# Patient Record
Sex: MALE | Race: WHITE | Employment: FULL TIME | URBAN - METROPOLITAN AREA
[De-identification: names, ages, dates, MRNs, and addresses within clinical notes are randomized per-mention and may not be internally consistent; named-entity substitution may affect disease eponyms.]

---

## 2017-01-09 ENCOUNTER — TELEPHONE (OUTPATIENT)
Dept: INTERNAL MEDICINE CLINIC | Age: 57
End: 2017-01-09

## 2017-01-10 ENCOUNTER — OFFICE VISIT (OUTPATIENT)
Dept: INTERNAL MEDICINE CLINIC | Age: 57
End: 2017-01-10

## 2017-01-10 VITALS
WEIGHT: 232 LBS | DIASTOLIC BLOOD PRESSURE: 76 MMHG | SYSTOLIC BLOOD PRESSURE: 130 MMHG | TEMPERATURE: 98.4 F | OXYGEN SATURATION: 94 % | HEART RATE: 64 BPM | BODY MASS INDEX: 30.75 KG/M2 | HEIGHT: 73 IN

## 2017-01-10 DIAGNOSIS — J32.9 SINUSITIS, UNSPECIFIED CHRONICITY, UNSPECIFIED LOCATION: Primary | ICD-10-CM

## 2017-01-10 PROCEDURE — 99213 OFFICE O/P EST LOW 20 MIN: CPT | Performed by: INTERNAL MEDICINE

## 2017-01-10 RX ORDER — AZITHROMYCIN 250 MG/1
TABLET, FILM COATED ORAL
Qty: 1 PACKET | Refills: 0 | Status: SHIPPED | OUTPATIENT
Start: 2017-01-10 | End: 2017-01-20

## 2017-01-10 ASSESSMENT — ENCOUNTER SYMPTOMS
RHINORRHEA: 0
SHORTNESS OF BREATH: 0
SINUS PRESSURE: 1
CHEST TIGHTNESS: 0
WHEEZING: 0
SORE THROAT: 0
COUGH: 0

## 2017-05-26 RX ORDER — LISINOPRIL 40 MG/1
40 TABLET ORAL DAILY
Qty: 90 TABLET | Refills: 0 | Status: SHIPPED | OUTPATIENT
Start: 2017-05-26 | End: 2018-01-09

## 2017-05-26 RX ORDER — AMLODIPINE BESYLATE 10 MG/1
10 TABLET ORAL DAILY
Qty: 90 TABLET | Refills: 0 | Status: SHIPPED | OUTPATIENT
Start: 2017-05-26 | End: 2018-01-09 | Stop reason: SDUPTHER

## 2017-05-26 RX ORDER — ATORVASTATIN CALCIUM 80 MG/1
TABLET, FILM COATED ORAL
Qty: 30 TABLET | Refills: 0 | Status: SHIPPED | OUTPATIENT
Start: 2017-05-26 | End: 2017-06-22 | Stop reason: SDUPTHER

## 2017-06-22 RX ORDER — AZITHROMYCIN 250 MG/1
TABLET, FILM COATED ORAL
Qty: 1 PACKET | Refills: 0 | Status: SHIPPED | OUTPATIENT
Start: 2017-06-22 | End: 2017-06-30 | Stop reason: ALTCHOICE

## 2017-06-22 RX ORDER — ATORVASTATIN CALCIUM 80 MG/1
TABLET, FILM COATED ORAL
Qty: 30 TABLET | Refills: 0 | Status: SHIPPED | OUTPATIENT
Start: 2017-06-22 | End: 2017-06-30 | Stop reason: SDUPTHER

## 2017-06-30 ENCOUNTER — OFFICE VISIT (OUTPATIENT)
Dept: INTERNAL MEDICINE CLINIC | Age: 57
End: 2017-06-30

## 2017-06-30 VITALS
TEMPERATURE: 98.5 F | HEART RATE: 62 BPM | WEIGHT: 237 LBS | RESPIRATION RATE: 12 BRPM | OXYGEN SATURATION: 97 % | HEIGHT: 73 IN | BODY MASS INDEX: 31.41 KG/M2 | SYSTOLIC BLOOD PRESSURE: 122 MMHG | DIASTOLIC BLOOD PRESSURE: 80 MMHG

## 2017-06-30 DIAGNOSIS — Z00.00 ANNUAL PHYSICAL EXAM: Primary | ICD-10-CM

## 2017-06-30 DIAGNOSIS — E11.9 TYPE 2 DIABETES MELLITUS WITHOUT COMPLICATION, WITHOUT LONG-TERM CURRENT USE OF INSULIN (HCC): ICD-10-CM

## 2017-06-30 DIAGNOSIS — I10 ESSENTIAL HYPERTENSION: ICD-10-CM

## 2017-06-30 DIAGNOSIS — E78.2 MIXED HYPERLIPIDEMIA: ICD-10-CM

## 2017-06-30 DIAGNOSIS — E66.9 OBESITY (BMI 30.0-34.9): ICD-10-CM

## 2017-06-30 PROBLEM — E66.811 OBESITY (BMI 30.0-34.9): Status: ACTIVE | Noted: 2017-06-30

## 2017-06-30 LAB
A/G RATIO: 2.1 (ref 1.1–2.2)
ALBUMIN SERPL-MCNC: 5 G/DL (ref 3.4–5)
ALP BLD-CCNC: 106 U/L (ref 40–129)
ALT SERPL-CCNC: 30 U/L (ref 10–40)
ANION GAP SERPL CALCULATED.3IONS-SCNC: 19 MMOL/L (ref 3–16)
AST SERPL-CCNC: 26 U/L (ref 15–37)
BILIRUB SERPL-MCNC: 1.1 MG/DL (ref 0–1)
BILIRUBIN, POC: NORMAL
BLOOD URINE, POC: NORMAL
BUN BLDV-MCNC: 18 MG/DL (ref 7–20)
CALCIUM SERPL-MCNC: 10.1 MG/DL (ref 8.3–10.6)
CHLORIDE BLD-SCNC: 99 MMOL/L (ref 99–110)
CHOLESTEROL, TOTAL: 207 MG/DL (ref 0–199)
CLARITY, POC: CLEAR
CO2: 21 MMOL/L (ref 21–32)
COLOR, POC: YELLOW
CREAT SERPL-MCNC: 0.9 MG/DL (ref 0.9–1.3)
CREATININE URINE: 116.3 MG/DL (ref 39–259)
GFR AFRICAN AMERICAN: >60
GFR NON-AFRICAN AMERICAN: >60
GLOBULIN: 2.4 G/DL
GLUCOSE BLD-MCNC: 117 MG/DL (ref 70–99)
GLUCOSE URINE, POC: NORMAL
HDLC SERPL-MCNC: 41 MG/DL (ref 40–60)
KETONES, POC: NORMAL
LDL CHOLESTEROL CALCULATED: 118 MG/DL
LEUKOCYTE EST, POC: NORMAL
MICROALBUMIN UR-MCNC: <1.2 MG/DL
MICROALBUMIN/CREAT UR-RTO: NORMAL MG/G (ref 0–30)
NITRITE, POC: NORMAL
PH, POC: 5
POTASSIUM SERPL-SCNC: 4.3 MMOL/L (ref 3.5–5.1)
PROTEIN, POC: NORMAL
SODIUM BLD-SCNC: 139 MMOL/L (ref 136–145)
SPECIFIC GRAVITY, POC: 1.01
TOTAL PROTEIN: 7.4 G/DL (ref 6.4–8.2)
TRIGL SERPL-MCNC: 238 MG/DL (ref 0–150)
UROBILINOGEN, POC: 0.2
VLDLC SERPL CALC-MCNC: 48 MG/DL

## 2017-06-30 PROCEDURE — 99396 PREV VISIT EST AGE 40-64: CPT | Performed by: INTERNAL MEDICINE

## 2017-06-30 PROCEDURE — 81002 URINALYSIS NONAUTO W/O SCOPE: CPT | Performed by: INTERNAL MEDICINE

## 2017-06-30 RX ORDER — ATORVASTATIN CALCIUM 80 MG/1
TABLET, FILM COATED ORAL
Qty: 90 TABLET | Refills: 1 | Status: SHIPPED | OUTPATIENT
Start: 2017-06-30 | End: 2018-01-09 | Stop reason: SDUPTHER

## 2017-06-30 ASSESSMENT — ENCOUNTER SYMPTOMS
SINUS PRESSURE: 0
RHINORRHEA: 0
VOICE CHANGE: 0
CHEST TIGHTNESS: 0
TROUBLE SWALLOWING: 0
ABDOMINAL DISTENTION: 0
EYE PAIN: 0
SHORTNESS OF BREATH: 0
BACK PAIN: 0
NAUSEA: 0
COLOR CHANGE: 0
COUGH: 0
WHEEZING: 0
APNEA: 0
VOMITING: 0
ABDOMINAL PAIN: 0
CONSTIPATION: 0
DIARRHEA: 0

## 2017-07-01 LAB
ESTIMATED AVERAGE GLUCOSE: 148.5 MG/DL
HBA1C MFR BLD: 6.8 %

## 2017-10-06 RX ORDER — SILDENAFIL CITRATE 20 MG/1
TABLET ORAL
Qty: 30 TABLET | Refills: 1 | Status: SHIPPED | OUTPATIENT
Start: 2017-10-06 | End: 2017-11-09 | Stop reason: SDUPTHER

## 2017-11-09 RX ORDER — SILDENAFIL CITRATE 20 MG/1
TABLET ORAL
Qty: 30 TABLET | Refills: 0 | Status: SHIPPED | OUTPATIENT
Start: 2017-11-09 | End: 2017-12-01 | Stop reason: SDUPTHER

## 2017-12-01 RX ORDER — SILDENAFIL CITRATE 20 MG/1
TABLET ORAL
Qty: 30 TABLET | Refills: 0 | Status: SHIPPED | OUTPATIENT
Start: 2017-12-01 | End: 2017-12-21 | Stop reason: SDUPTHER

## 2017-12-21 RX ORDER — SILDENAFIL CITRATE 20 MG/1
TABLET ORAL
Qty: 30 TABLET | Refills: 0 | Status: SHIPPED | OUTPATIENT
Start: 2017-12-21 | End: 2018-01-04 | Stop reason: SDUPTHER

## 2018-01-02 RX ORDER — LISINOPRIL 40 MG/1
TABLET ORAL
Qty: 30 TABLET | Refills: 0 | Status: SHIPPED | OUTPATIENT
Start: 2018-01-02 | End: 2018-01-09

## 2018-01-02 RX ORDER — AMLODIPINE BESYLATE 10 MG/1
TABLET ORAL
Qty: 30 TABLET | Refills: 0 | Status: SHIPPED | OUTPATIENT
Start: 2018-01-02 | End: 2018-01-09

## 2018-01-04 RX ORDER — SILDENAFIL CITRATE 20 MG/1
TABLET ORAL
Qty: 30 TABLET | Refills: 1 | Status: SHIPPED | OUTPATIENT
Start: 2018-01-04 | End: 2018-02-16 | Stop reason: SDUPTHER

## 2018-01-04 NOTE — TELEPHONE ENCOUNTER
Tell him that I sent the medication BUT he can get 30 pills at [facility] for less than $15 if he prints the coupon for them at [facility].

## 2018-01-09 ENCOUNTER — OFFICE VISIT (OUTPATIENT)
Dept: INTERNAL MEDICINE CLINIC | Age: 58
End: 2018-01-09

## 2018-01-09 VITALS
WEIGHT: 244 LBS | SYSTOLIC BLOOD PRESSURE: 160 MMHG | DIASTOLIC BLOOD PRESSURE: 92 MMHG | BODY MASS INDEX: 32.19 KG/M2 | HEART RATE: 60 BPM

## 2018-01-09 DIAGNOSIS — Z12.5 SPECIAL SCREENING FOR MALIGNANT NEOPLASM OF PROSTATE: ICD-10-CM

## 2018-01-09 DIAGNOSIS — N52.9 ERECTILE DYSFUNCTION, UNSPECIFIED ERECTILE DYSFUNCTION TYPE: ICD-10-CM

## 2018-01-09 DIAGNOSIS — Z23 FLU VACCINE NEED: ICD-10-CM

## 2018-01-09 DIAGNOSIS — E78.2 MIXED HYPERLIPIDEMIA: ICD-10-CM

## 2018-01-09 DIAGNOSIS — E11.9 TYPE 2 DIABETES MELLITUS WITHOUT COMPLICATION, WITHOUT LONG-TERM CURRENT USE OF INSULIN (HCC): Primary | ICD-10-CM

## 2018-01-09 DIAGNOSIS — I10 ESSENTIAL HYPERTENSION: ICD-10-CM

## 2018-01-09 PROCEDURE — 90630 INFLUENZA, QUADV, 18-64 YRS, ID, PF, MICRO INJ, 0.1ML (FLUZONE QUADV, PF): CPT | Performed by: INTERNAL MEDICINE

## 2018-01-09 PROCEDURE — 90471 IMMUNIZATION ADMIN: CPT | Performed by: INTERNAL MEDICINE

## 2018-01-09 PROCEDURE — 99214 OFFICE O/P EST MOD 30 MIN: CPT | Performed by: INTERNAL MEDICINE

## 2018-01-09 RX ORDER — ATORVASTATIN CALCIUM 80 MG/1
TABLET, FILM COATED ORAL
Qty: 90 TABLET | Refills: 1 | Status: SHIPPED | OUTPATIENT
Start: 2018-01-09 | End: 2018-08-01 | Stop reason: SDUPTHER

## 2018-01-09 RX ORDER — AMLODIPINE BESYLATE 10 MG/1
10 TABLET ORAL DAILY
Qty: 90 TABLET | Refills: 1 | Status: SHIPPED | OUTPATIENT
Start: 2018-01-09 | End: 2018-08-01 | Stop reason: SDUPTHER

## 2018-01-09 RX ORDER — LISINOPRIL AND HYDROCHLOROTHIAZIDE 25; 20 MG/1; MG/1
1 TABLET ORAL DAILY
Qty: 90 TABLET | Refills: 1 | Status: SHIPPED | OUTPATIENT
Start: 2018-01-09 | End: 2018-07-10 | Stop reason: SDUPTHER

## 2018-01-09 ASSESSMENT — PATIENT HEALTH QUESTIONNAIRE - PHQ9
SUM OF ALL RESPONSES TO PHQ9 QUESTIONS 1 & 2: 0
1. LITTLE INTEREST OR PLEASURE IN DOING THINGS: 0
2. FEELING DOWN, DEPRESSED OR HOPELESS: 0
SUM OF ALL RESPONSES TO PHQ QUESTIONS 1-9: 0

## 2018-01-09 ASSESSMENT — ENCOUNTER SYMPTOMS
DIARRHEA: 0
COUGH: 0
SHORTNESS OF BREATH: 0
ABDOMINAL PAIN: 0
CONSTIPATION: 0

## 2018-01-09 NOTE — PROGRESS NOTES
INJ, 0.1ML (FLUZONE QUADV, PF)   3. Erectile dysfunction, unspecified erectile dysfunction type N52.9 607.84    4. Special screening for malignant neoplasm of prostate Z12.5 V76.44 Psa screening   5. Essential hypertension I10 401.9 Comprehensive Metabolic Panel   6. Mixed hyperlipidemia E78.2 272.2 Lipid Panel          Plan:      Dm 2 - check lab  Monitor!     ED - rx    htn - change meds, add hctz and reduce lisinopril  Watch bp   Goals discussed    Lipid - check lab

## 2018-01-10 LAB
A/G RATIO: 2 (ref 1.1–2.2)
ALBUMIN SERPL-MCNC: 4.9 G/DL (ref 3.4–5)
ALP BLD-CCNC: 106 U/L (ref 40–129)
ALT SERPL-CCNC: 32 U/L (ref 10–40)
ANION GAP SERPL CALCULATED.3IONS-SCNC: 17 MMOL/L (ref 3–16)
AST SERPL-CCNC: 27 U/L (ref 15–37)
BILIRUB SERPL-MCNC: 0.9 MG/DL (ref 0–1)
BUN BLDV-MCNC: 8 MG/DL (ref 7–20)
CALCIUM SERPL-MCNC: 10.1 MG/DL (ref 8.3–10.6)
CHLORIDE BLD-SCNC: 99 MMOL/L (ref 99–110)
CHOLESTEROL, TOTAL: 211 MG/DL (ref 0–199)
CO2: 26 MMOL/L (ref 21–32)
CREAT SERPL-MCNC: 1 MG/DL (ref 0.9–1.3)
ESTIMATED AVERAGE GLUCOSE: 154.2 MG/DL
GFR AFRICAN AMERICAN: >60
GFR NON-AFRICAN AMERICAN: >60
GLOBULIN: 2.5 G/DL
GLUCOSE BLD-MCNC: 101 MG/DL (ref 70–99)
HBA1C MFR BLD: 7 %
HDLC SERPL-MCNC: 42 MG/DL (ref 40–60)
LDL CHOLESTEROL CALCULATED: 115 MG/DL
POTASSIUM SERPL-SCNC: 4.5 MMOL/L (ref 3.5–5.1)
PROSTATE SPECIFIC ANTIGEN: 1.46 NG/ML (ref 0–4)
SODIUM BLD-SCNC: 142 MMOL/L (ref 136–145)
TOTAL PROTEIN: 7.4 G/DL (ref 6.4–8.2)
TRIGL SERPL-MCNC: 270 MG/DL (ref 0–150)
VLDLC SERPL CALC-MCNC: 54 MG/DL

## 2018-02-13 ENCOUNTER — TELEPHONE (OUTPATIENT)
Dept: INTERNAL MEDICINE CLINIC | Age: 58
End: 2018-02-13

## 2018-02-13 RX ORDER — AMOXICILLIN 875 MG/1
875 TABLET, COATED ORAL 2 TIMES DAILY
Qty: 20 TABLET | Refills: 0 | Status: SHIPPED | OUTPATIENT
Start: 2018-02-13 | End: 2018-02-14 | Stop reason: CLARIF

## 2018-02-14 ENCOUNTER — OFFICE VISIT (OUTPATIENT)
Dept: INTERNAL MEDICINE CLINIC | Age: 58
End: 2018-02-14

## 2018-02-14 VITALS
HEART RATE: 68 BPM | HEIGHT: 73 IN | BODY MASS INDEX: 32.37 KG/M2 | OXYGEN SATURATION: 96 % | TEMPERATURE: 98.5 F | DIASTOLIC BLOOD PRESSURE: 80 MMHG | WEIGHT: 244.2 LBS | SYSTOLIC BLOOD PRESSURE: 122 MMHG

## 2018-02-14 DIAGNOSIS — R05.9 COUGH: Primary | ICD-10-CM

## 2018-02-14 PROCEDURE — 99213 OFFICE O/P EST LOW 20 MIN: CPT | Performed by: INTERNAL MEDICINE

## 2018-02-14 RX ORDER — AMOXICILLIN 875 MG/1
875 TABLET, COATED ORAL 2 TIMES DAILY
COMMUNITY
End: 2018-08-01 | Stop reason: ALTCHOICE

## 2018-02-14 RX ORDER — AZELASTINE 1 MG/ML
2 SPRAY, METERED NASAL 2 TIMES DAILY PRN
Qty: 1 BOTTLE | Refills: 0 | Status: SHIPPED | OUTPATIENT
Start: 2018-02-14 | End: 2019-02-14

## 2018-02-14 ASSESSMENT — ENCOUNTER SYMPTOMS
COUGH: 1
NAUSEA: 0
SHORTNESS OF BREATH: 0
VOMITING: 0

## 2018-02-16 RX ORDER — SILDENAFIL CITRATE 20 MG/1
TABLET ORAL
Qty: 30 TABLET | Refills: 0 | Status: SHIPPED | OUTPATIENT
Start: 2018-02-16 | End: 2018-03-03 | Stop reason: SDUPTHER

## 2018-03-05 RX ORDER — SILDENAFIL CITRATE 20 MG/1
TABLET ORAL
Qty: 30 TABLET | Refills: 2 | Status: SHIPPED | OUTPATIENT
Start: 2018-03-05 | End: 2018-04-29 | Stop reason: SDUPTHER

## 2018-04-04 ENCOUNTER — OFFICE VISIT (OUTPATIENT)
Dept: INTERNAL MEDICINE CLINIC | Age: 58
End: 2018-04-04

## 2018-04-04 VITALS
BODY MASS INDEX: 31.24 KG/M2 | OXYGEN SATURATION: 98 % | SYSTOLIC BLOOD PRESSURE: 138 MMHG | HEART RATE: 58 BPM | WEIGHT: 236.8 LBS | DIASTOLIC BLOOD PRESSURE: 80 MMHG

## 2018-04-04 DIAGNOSIS — R55 SYNCOPE, UNSPECIFIED SYNCOPE TYPE: Primary | ICD-10-CM

## 2018-04-04 DIAGNOSIS — E11.9 TYPE 2 DIABETES MELLITUS WITHOUT COMPLICATION, WITHOUT LONG-TERM CURRENT USE OF INSULIN (HCC): ICD-10-CM

## 2018-04-04 PROCEDURE — 99214 OFFICE O/P EST MOD 30 MIN: CPT | Performed by: INTERNAL MEDICINE

## 2018-04-04 RX ORDER — LANCETS 30 GAUGE
EACH MISCELLANEOUS
Qty: 100 EACH | Refills: 1 | Status: SHIPPED | OUTPATIENT
Start: 2018-04-04

## 2018-04-04 RX ORDER — GLUCOSAMINE HCL/CHONDROITIN SU 500-400 MG
CAPSULE ORAL
Qty: 100 STRIP | Refills: 1 | Status: SHIPPED | OUTPATIENT
Start: 2018-04-04 | End: 2018-11-25 | Stop reason: SDUPTHER

## 2018-04-04 ASSESSMENT — ENCOUNTER SYMPTOMS
VOMITING: 0
WHEEZING: 0
EYE PAIN: 0
NAUSEA: 0
SHORTNESS OF BREATH: 0
ABDOMINAL PAIN: 0

## 2018-04-18 ENCOUNTER — TELEPHONE (OUTPATIENT)
Dept: INTERNAL MEDICINE CLINIC | Age: 58
End: 2018-04-18

## 2018-04-30 RX ORDER — SILDENAFIL CITRATE 20 MG/1
TABLET ORAL
Qty: 30 TABLET | Refills: 1 | Status: SHIPPED | OUTPATIENT
Start: 2018-04-30 | End: 2019-07-31 | Stop reason: SDUPTHER

## 2018-05-11 ENCOUNTER — TELEPHONE (OUTPATIENT)
Dept: INTERNAL MEDICINE CLINIC | Age: 58
End: 2018-05-11

## 2018-07-10 RX ORDER — LISINOPRIL AND HYDROCHLOROTHIAZIDE 25; 20 MG/1; MG/1
TABLET ORAL
Qty: 90 TABLET | Refills: 0 | Status: SHIPPED | OUTPATIENT
Start: 2018-07-10 | End: 2018-10-11 | Stop reason: SDUPTHER

## 2018-08-01 ENCOUNTER — OFFICE VISIT (OUTPATIENT)
Dept: INTERNAL MEDICINE CLINIC | Age: 58
End: 2018-08-01

## 2018-08-01 VITALS
BODY MASS INDEX: 31.17 KG/M2 | TEMPERATURE: 98 F | WEIGHT: 235.2 LBS | SYSTOLIC BLOOD PRESSURE: 130 MMHG | HEART RATE: 60 BPM | OXYGEN SATURATION: 94 % | HEIGHT: 73 IN | DIASTOLIC BLOOD PRESSURE: 86 MMHG

## 2018-08-01 DIAGNOSIS — E78.2 MIXED HYPERLIPIDEMIA: ICD-10-CM

## 2018-08-01 DIAGNOSIS — Z00.00 ANNUAL PHYSICAL EXAM: Primary | ICD-10-CM

## 2018-08-01 DIAGNOSIS — Z00.00 ANNUAL PHYSICAL EXAM: ICD-10-CM

## 2018-08-01 DIAGNOSIS — I10 ESSENTIAL HYPERTENSION: ICD-10-CM

## 2018-08-01 DIAGNOSIS — E11.9 TYPE 2 DIABETES MELLITUS WITHOUT COMPLICATION, WITHOUT LONG-TERM CURRENT USE OF INSULIN (HCC): ICD-10-CM

## 2018-08-01 LAB
A/G RATIO: 2 (ref 1.1–2.2)
ALBUMIN SERPL-MCNC: 5.1 G/DL (ref 3.4–5)
ALP BLD-CCNC: 97 U/L (ref 40–129)
ALT SERPL-CCNC: 31 U/L (ref 10–40)
ANION GAP SERPL CALCULATED.3IONS-SCNC: 14 MMOL/L (ref 3–16)
AST SERPL-CCNC: 25 U/L (ref 15–37)
BILIRUB SERPL-MCNC: 0.5 MG/DL (ref 0–1)
BILIRUBIN, POC: NEGATIVE
BLOOD URINE, POC: NEGATIVE
BUN BLDV-MCNC: 17 MG/DL (ref 7–20)
CALCIUM SERPL-MCNC: 10.3 MG/DL (ref 8.3–10.6)
CHLORIDE BLD-SCNC: 98 MMOL/L (ref 99–110)
CHOLESTEROL, TOTAL: 191 MG/DL (ref 0–199)
CLARITY, POC: CLEAR
CO2: 26 MMOL/L (ref 21–32)
COLOR, POC: YELLOW
CREAT SERPL-MCNC: 1.1 MG/DL (ref 0.9–1.3)
CREATININE URINE: 81.6 MG/DL (ref 39–259)
GFR AFRICAN AMERICAN: >60
GFR NON-AFRICAN AMERICAN: >60
GLOBULIN: 2.5 G/DL
GLUCOSE BLD-MCNC: 155 MG/DL (ref 70–99)
GLUCOSE URINE, POC: NEGATIVE
HDLC SERPL-MCNC: 43 MG/DL (ref 40–60)
KETONES, POC: NEGATIVE
LDL CHOLESTEROL CALCULATED: 100 MG/DL
LEUKOCYTE EST, POC: NEGATIVE
MICROALBUMIN UR-MCNC: <1.2 MG/DL
MICROALBUMIN/CREAT UR-RTO: NORMAL MG/G (ref 0–30)
NITRITE, POC: NEGATIVE
PH, POC: 6
POTASSIUM SERPL-SCNC: 4.4 MMOL/L (ref 3.5–5.1)
PROTEIN, POC: NEGATIVE
SODIUM BLD-SCNC: 138 MMOL/L (ref 136–145)
SPECIFIC GRAVITY, POC: 1.01
TOTAL PROTEIN: 7.6 G/DL (ref 6.4–8.2)
TRIGL SERPL-MCNC: 241 MG/DL (ref 0–150)
UROBILINOGEN, POC: 0.2
VLDLC SERPL CALC-MCNC: 48 MG/DL

## 2018-08-01 PROCEDURE — 99396 PREV VISIT EST AGE 40-64: CPT | Performed by: INTERNAL MEDICINE

## 2018-08-01 PROCEDURE — 81002 URINALYSIS NONAUTO W/O SCOPE: CPT | Performed by: INTERNAL MEDICINE

## 2018-08-01 RX ORDER — AMLODIPINE BESYLATE 10 MG/1
10 TABLET ORAL DAILY
Qty: 90 TABLET | Refills: 1 | Status: SHIPPED | OUTPATIENT
Start: 2018-08-01 | End: 2019-02-11 | Stop reason: SDUPTHER

## 2018-08-01 RX ORDER — ATORVASTATIN CALCIUM 80 MG/1
TABLET, FILM COATED ORAL
Qty: 90 TABLET | Refills: 1 | Status: SHIPPED | OUTPATIENT
Start: 2018-08-01 | End: 2019-03-12 | Stop reason: SDUPTHER

## 2018-08-01 ASSESSMENT — ENCOUNTER SYMPTOMS
WHEEZING: 0
CHEST TIGHTNESS: 0
VOICE CHANGE: 0
CONSTIPATION: 0
ABDOMINAL DISTENTION: 0
SINUS PRESSURE: 0
COUGH: 0
BACK PAIN: 1
TROUBLE SWALLOWING: 0
DIARRHEA: 0
NAUSEA: 0
EYE PAIN: 0
SHORTNESS OF BREATH: 0
COLOR CHANGE: 0
RHINORRHEA: 0
ABDOMINAL PAIN: 0
APNEA: 0
VOMITING: 0

## 2018-08-01 NOTE — PATIENT INSTRUCTIONS
Please talk to your pharmacist about how much the new shingles vaccination (Shingrix) would cost you. In the state of [place], you do not need a prescription from a doctor to receive this. Please bring both your glucose monitor and your glucose records to your next appointment.

## 2018-08-01 NOTE — PROGRESS NOTES
Medical History:       Diagnosis Date    Elevated IOP     Environmental allergies     Erectile dysfunction 9/14/2010    Hyperlipidemia     Hypertension     Sciatica     sees chiro    Type 2 diabetes mellitus without complication (HCC)      Social History  Social History   Substance Use Topics    Smoking status: Never Smoker    Smokeless tobacco: Never Used    Alcohol use 2.2 oz/week     2 Cans of beer, 2 Standard drinks or equivalent per week      Comment:  2 beers per week     Prior Surgical History:      Procedure Laterality Date    KNEE SURGERY      right    TONSILLECTOMY      VASECTOMY       Family History:   Family History   Problem Relation Age of Onset    Cancer Mother         Kidney cancer    High Blood Pressure Mother     Heart Disease Mother         cad, cabg    Coronary Art Dis Mother     Other Father         Colonic polyps-- not cancer    High Blood Pressure Father     High Cholesterol Father     Diabetes Father     Cancer Father         Lung cancer, laryngeal    Cancer Maternal Grandmother         ? ?    Heart Attack Maternal Grandfather     Dislocations Paternal Grandmother     Other Paternal Grandmother         CVA    Diabetes Paternal Grandmother     Other Paternal Grandfather         Surgical complications for prostate    High Blood Pressure Sister     Cancer Sister         uterine    High Blood Pressure Brother     Diabetes Brother          Review of Systems   Constitutional: Negative for activity change, appetite change, chills, fatigue, fever and unexpected weight change. HENT: Negative for dental problem, hearing loss (possibly minor), rhinorrhea, sinus pressure, tinnitus, trouble swallowing and voice change. Eyes: Negative for pain and visual disturbance. Last eye exam within the last year   Respiratory: Negative for apnea, cough, chest tightness, shortness of breath and wheezing.     Cardiovascular: Negative for chest pain, palpitations and leg swelling. Gastrointestinal: Negative for abdominal distention, abdominal pain, constipation, diarrhea, nausea and vomiting. No heartburn   Endocrine: Negative for cold intolerance and heat intolerance. Genitourinary: Negative for difficulty urinating, dysuria, frequency, hematuria, scrotal swelling and testicular pain. No nocturia  ED - occ uses sildenafil   Musculoskeletal: Positive for arthralgias (see hpi) and back pain (only occasionally, good since doing core exercises). Negative for joint swelling and myalgias. Skin: Negative for color change, pallor, rash and wound. Allergic/Immunologic: Positive for environmental allergies (good year). Neurological: Negative for dizziness, tremors, syncope, speech difficulty, weakness, numbness and headaches. Hematological: Negative for adenopathy. Does not bruise/bleed easily. Psychiatric/Behavioral: Negative for dysphoric mood, sleep disturbance and suicidal ideas. The patient is not nervous/anxious. Objective:   Physical Exam   Constitutional: He is oriented to person, place, and time. He appears well-developed and well-nourished. HENT:   Head: Normocephalic and atraumatic. Right Ear: External ear normal.   Left Ear: External ear normal.   Nose: Nose normal.   Mouth/Throat: Oropharynx is clear and moist.   Eyes: Conjunctivae and EOM are normal. Pupils are equal, round, and reactive to light. No scleral icterus. Neck: Normal range of motion. Neck supple. No JVD present. No thyromegaly present. Cardiovascular: Normal rate, regular rhythm, normal heart sounds and intact distal pulses. Pulmonary/Chest: Effort normal and breath sounds normal.   Abdominal: Soft. Bowel sounds are normal. He exhibits no distension and no mass. There is no tenderness. There is no rebound and no guarding. Genitourinary: Rectum normal and prostate normal. Rectal exam shows guaiac negative stool. Musculoskeletal: Normal range of motion.  He exhibits no

## 2018-08-02 LAB
ESTIMATED AVERAGE GLUCOSE: 151.3 MG/DL
HBA1C MFR BLD: 6.9 %

## 2018-10-11 RX ORDER — LISINOPRIL AND HYDROCHLOROTHIAZIDE 25; 20 MG/1; MG/1
1 TABLET ORAL DAILY
Qty: 30 TABLET | Refills: 0 | Status: SHIPPED | OUTPATIENT
Start: 2018-10-11 | End: 2018-11-09 | Stop reason: SDUPTHER

## 2018-11-09 RX ORDER — LISINOPRIL AND HYDROCHLOROTHIAZIDE 25; 20 MG/1; MG/1
TABLET ORAL
Qty: 30 TABLET | Refills: 0 | Status: SHIPPED | OUTPATIENT
Start: 2018-11-09 | End: 2018-12-08 | Stop reason: SDUPTHER

## 2018-11-25 DIAGNOSIS — E11.9 TYPE 2 DIABETES MELLITUS WITHOUT COMPLICATION, WITHOUT LONG-TERM CURRENT USE OF INSULIN (HCC): ICD-10-CM

## 2018-12-10 RX ORDER — LISINOPRIL AND HYDROCHLOROTHIAZIDE 25; 20 MG/1; MG/1
TABLET ORAL
Qty: 30 TABLET | Refills: 0 | Status: SHIPPED | OUTPATIENT
Start: 2018-12-10 | End: 2019-01-10 | Stop reason: SDUPTHER

## 2019-01-10 RX ORDER — LISINOPRIL AND HYDROCHLOROTHIAZIDE 25; 20 MG/1; MG/1
TABLET ORAL
Qty: 30 TABLET | Refills: 0 | Status: SHIPPED | OUTPATIENT
Start: 2019-01-10 | End: 2019-02-11 | Stop reason: SDUPTHER

## 2019-02-06 ENCOUNTER — OFFICE VISIT (OUTPATIENT)
Dept: INTERNAL MEDICINE CLINIC | Age: 59
End: 2019-02-06
Payer: COMMERCIAL

## 2019-02-06 VITALS
DIASTOLIC BLOOD PRESSURE: 72 MMHG | OXYGEN SATURATION: 96 % | BODY MASS INDEX: 31.73 KG/M2 | HEIGHT: 73 IN | HEART RATE: 73 BPM | SYSTOLIC BLOOD PRESSURE: 118 MMHG | WEIGHT: 239.4 LBS

## 2019-02-06 DIAGNOSIS — I10 ESSENTIAL HYPERTENSION: ICD-10-CM

## 2019-02-06 DIAGNOSIS — E78.2 MIXED HYPERLIPIDEMIA: ICD-10-CM

## 2019-02-06 DIAGNOSIS — E11.9 TYPE 2 DIABETES MELLITUS WITHOUT COMPLICATION, WITHOUT LONG-TERM CURRENT USE OF INSULIN (HCC): Primary | ICD-10-CM

## 2019-02-06 LAB — HBA1C MFR BLD: 8.6 %

## 2019-02-06 PROCEDURE — G8427 DOCREV CUR MEDS BY ELIG CLIN: HCPCS | Performed by: INTERNAL MEDICINE

## 2019-02-06 PROCEDURE — 99214 OFFICE O/P EST MOD 30 MIN: CPT | Performed by: INTERNAL MEDICINE

## 2019-02-06 PROCEDURE — G8484 FLU IMMUNIZE NO ADMIN: HCPCS | Performed by: INTERNAL MEDICINE

## 2019-02-06 PROCEDURE — G8417 CALC BMI ABV UP PARAM F/U: HCPCS | Performed by: INTERNAL MEDICINE

## 2019-02-06 PROCEDURE — 1036F TOBACCO NON-USER: CPT | Performed by: INTERNAL MEDICINE

## 2019-02-06 PROCEDURE — 3017F COLORECTAL CA SCREEN DOC REV: CPT | Performed by: INTERNAL MEDICINE

## 2019-02-06 PROCEDURE — 2022F DILAT RTA XM EVC RTNOPTHY: CPT | Performed by: INTERNAL MEDICINE

## 2019-02-06 PROCEDURE — 83036 HEMOGLOBIN GLYCOSYLATED A1C: CPT | Performed by: INTERNAL MEDICINE

## 2019-02-06 PROCEDURE — 3045F PR MOST RECENT HEMOGLOBIN A1C LEVEL 7.0-9.0%: CPT | Performed by: INTERNAL MEDICINE

## 2019-02-06 RX ORDER — LISINOPRIL AND HYDROCHLOROTHIAZIDE 25; 20 MG/1; MG/1
TABLET ORAL
Qty: 30 TABLET | Refills: 0 | OUTPATIENT
Start: 2019-02-06

## 2019-02-06 RX ORDER — AMLODIPINE BESYLATE 10 MG/1
10 TABLET ORAL DAILY
Qty: 90 TABLET | Refills: 1 | OUTPATIENT
Start: 2019-02-06

## 2019-02-06 ASSESSMENT — PATIENT HEALTH QUESTIONNAIRE - PHQ9
2. FEELING DOWN, DEPRESSED OR HOPELESS: 0
SUM OF ALL RESPONSES TO PHQ9 QUESTIONS 1 & 2: 0
SUM OF ALL RESPONSES TO PHQ QUESTIONS 1-9: 0
1. LITTLE INTEREST OR PLEASURE IN DOING THINGS: 0
SUM OF ALL RESPONSES TO PHQ QUESTIONS 1-9: 0

## 2019-02-12 RX ORDER — LISINOPRIL AND HYDROCHLOROTHIAZIDE 25; 20 MG/1; MG/1
TABLET ORAL
Qty: 90 TABLET | Refills: 1 | Status: SHIPPED | OUTPATIENT
Start: 2019-02-12 | End: 2019-07-31 | Stop reason: SDUPTHER

## 2019-02-12 RX ORDER — AMLODIPINE BESYLATE 10 MG/1
10 TABLET ORAL DAILY
Qty: 90 TABLET | Refills: 1 | Status: SHIPPED | OUTPATIENT
Start: 2019-02-12 | End: 2019-07-31 | Stop reason: SDUPTHER

## 2019-02-26 DIAGNOSIS — E11.9 TYPE 2 DIABETES MELLITUS WITHOUT COMPLICATION, WITHOUT LONG-TERM CURRENT USE OF INSULIN (HCC): ICD-10-CM

## 2019-03-12 RX ORDER — ATORVASTATIN CALCIUM 80 MG/1
TABLET, FILM COATED ORAL
Qty: 90 TABLET | Refills: 1 | Status: SHIPPED | OUTPATIENT
Start: 2019-03-12 | End: 2019-07-31 | Stop reason: SDUPTHER

## 2019-07-30 ENCOUNTER — TELEPHONE (OUTPATIENT)
Dept: INTERNAL MEDICINE CLINIC | Age: 59
End: 2019-07-30

## 2019-07-31 ENCOUNTER — OFFICE VISIT (OUTPATIENT)
Dept: INTERNAL MEDICINE CLINIC | Age: 59
End: 2019-07-31
Payer: COMMERCIAL

## 2019-07-31 VITALS
HEART RATE: 70 BPM | WEIGHT: 230 LBS | HEIGHT: 73 IN | DIASTOLIC BLOOD PRESSURE: 80 MMHG | RESPIRATION RATE: 16 BRPM | OXYGEN SATURATION: 98 % | SYSTOLIC BLOOD PRESSURE: 128 MMHG | BODY MASS INDEX: 30.48 KG/M2

## 2019-07-31 DIAGNOSIS — E11.9 TYPE 2 DIABETES MELLITUS WITHOUT COMPLICATION, WITHOUT LONG-TERM CURRENT USE OF INSULIN (HCC): Primary | ICD-10-CM

## 2019-07-31 DIAGNOSIS — E78.2 MIXED HYPERLIPIDEMIA: ICD-10-CM

## 2019-07-31 DIAGNOSIS — I10 ESSENTIAL HYPERTENSION: ICD-10-CM

## 2019-07-31 LAB — HBA1C MFR BLD: 7.4 %

## 2019-07-31 PROCEDURE — G8417 CALC BMI ABV UP PARAM F/U: HCPCS | Performed by: INTERNAL MEDICINE

## 2019-07-31 PROCEDURE — 83036 HEMOGLOBIN GLYCOSYLATED A1C: CPT | Performed by: INTERNAL MEDICINE

## 2019-07-31 PROCEDURE — 2022F DILAT RTA XM EVC RTNOPTHY: CPT | Performed by: INTERNAL MEDICINE

## 2019-07-31 PROCEDURE — 1036F TOBACCO NON-USER: CPT | Performed by: INTERNAL MEDICINE

## 2019-07-31 PROCEDURE — 3045F PR MOST RECENT HEMOGLOBIN A1C LEVEL 7.0-9.0%: CPT | Performed by: INTERNAL MEDICINE

## 2019-07-31 PROCEDURE — 99214 OFFICE O/P EST MOD 30 MIN: CPT | Performed by: INTERNAL MEDICINE

## 2019-07-31 PROCEDURE — 3017F COLORECTAL CA SCREEN DOC REV: CPT | Performed by: INTERNAL MEDICINE

## 2019-07-31 PROCEDURE — G8427 DOCREV CUR MEDS BY ELIG CLIN: HCPCS | Performed by: INTERNAL MEDICINE

## 2019-07-31 RX ORDER — LISINOPRIL AND HYDROCHLOROTHIAZIDE 25; 20 MG/1; MG/1
TABLET ORAL
Qty: 90 TABLET | Refills: 3 | Status: SHIPPED | OUTPATIENT
Start: 2019-07-31 | End: 2020-07-29

## 2019-07-31 RX ORDER — SILDENAFIL CITRATE 20 MG/1
TABLET ORAL
Qty: 30 TABLET | Refills: 3 | Status: SHIPPED | OUTPATIENT
Start: 2019-07-31 | End: 2019-10-15 | Stop reason: SDUPTHER

## 2019-07-31 RX ORDER — AMLODIPINE BESYLATE 10 MG/1
10 TABLET ORAL DAILY
Qty: 90 TABLET | Refills: 3 | Status: SHIPPED | OUTPATIENT
Start: 2019-07-31 | End: 2020-07-29

## 2019-07-31 RX ORDER — ATORVASTATIN CALCIUM 80 MG/1
TABLET, FILM COATED ORAL
Qty: 90 TABLET | Refills: 3 | Status: SHIPPED | OUTPATIENT
Start: 2019-07-31 | End: 2020-09-02

## 2019-07-31 NOTE — ASSESSMENT & PLAN NOTE
Diabetes Mellitus Type II:  Home blood sugar records reviewed: fasting range: 120-130. No significant episodes of hypoglycemia. Compliant with medications. No polyuria, polydipsia, visual changes, foot problems, GI upset. Diabetic diet compliance:  compliant all of the time. Current exercise: none. Will check labs, and refill medications as appropriate. Hypertension:  Denies CP, SOB, visual changes, dizziness, palpitations or HA. He is adherent to a low sodium diet. Blood pressure typically runs ok outside of the office. Continue current medications. Hyperlipidemia:  No new myalgias or GI upset on current medications. Lab Results   Component Value Date    LABA1C 8.6 02/06/2019    LABA1C 6.9 08/01/2018    LABA1C 7.0 01/09/2018     Lab Results   Component Value Date    LABMICR <1.20 08/01/2018    CREATININE 1.1 08/01/2018     Lab Results   Component Value Date    ALT 31 08/01/2018    AST 25 08/01/2018     No components found for: CHLPL  Lab Results   Component Value Date    TRIG 241 (H) 08/01/2018     Lab Results   Component Value Date    HDL 43 08/01/2018     Lab Results   Component Value Date    LDLCALC 100 08/01/2018    LDLDIRECT 120 03/22/2016         BP Readings from Last 2 Encounters:   07/31/19 128/80   02/06/19 118/72     Hemoglobin A1C (%)   Date Value   02/06/2019 8.6     Microscopic Examination (no units)   Date Value   09/19/2016 Not Indicated     Microalbumin, Random Urine (mg/dL)   Date Value   08/01/2018 <1.20     LDL Calculated (mg/dL)   Date Value   08/01/2018 100 (H)              Tobacco use:  Patient  reports that he has never smoked. He has never used smokeless tobacco.    If Smoker - Cessation materials given? NA    Is Daily aspirin on medication list?:  Yes    Diabetic retinal exam done? Yes   If yes, document in Health Maintenance. Monofilament placed on counter? Yes    Shoes and socks removed? Yes    BP taken with correct size cuff?  Yes   Repeated if > 130/80 Yes

## 2019-10-15 RX ORDER — SILDENAFIL CITRATE 20 MG/1
TABLET ORAL
Qty: 30 TABLET | Refills: 2 | Status: SHIPPED | OUTPATIENT
Start: 2019-10-15 | End: 2019-11-29 | Stop reason: SDUPTHER

## 2019-11-01 ENCOUNTER — OFFICE VISIT (OUTPATIENT)
Dept: PRIMARY CARE CLINIC | Age: 59
End: 2019-11-01
Payer: COMMERCIAL

## 2019-11-01 VITALS
OXYGEN SATURATION: 99 % | DIASTOLIC BLOOD PRESSURE: 80 MMHG | WEIGHT: 225.2 LBS | SYSTOLIC BLOOD PRESSURE: 130 MMHG | HEART RATE: 58 BPM | HEIGHT: 73 IN | BODY MASS INDEX: 29.85 KG/M2

## 2019-11-01 DIAGNOSIS — E11.9 TYPE 2 DIABETES MELLITUS WITHOUT COMPLICATION, WITHOUT LONG-TERM CURRENT USE OF INSULIN (HCC): ICD-10-CM

## 2019-11-01 DIAGNOSIS — N52.9 ERECTILE DYSFUNCTION, UNSPECIFIED ERECTILE DYSFUNCTION TYPE: ICD-10-CM

## 2019-11-01 DIAGNOSIS — E78.2 MIXED HYPERLIPIDEMIA: ICD-10-CM

## 2019-11-01 DIAGNOSIS — Z12.5 SCREENING FOR PROSTATE CANCER: ICD-10-CM

## 2019-11-01 DIAGNOSIS — Z00.00 PHYSICAL EXAM, ANNUAL: Primary | ICD-10-CM

## 2019-11-01 DIAGNOSIS — I10 ESSENTIAL HYPERTENSION: ICD-10-CM

## 2019-11-01 DIAGNOSIS — Z23 NEED FOR INFLUENZA VACCINATION: ICD-10-CM

## 2019-11-01 LAB
A/G RATIO: 3.2 (ref 1.1–2.2)
ALBUMIN SERPL-MCNC: 5.4 G/DL (ref 3.4–5)
ALP BLD-CCNC: 86 U/L (ref 40–129)
ALT SERPL-CCNC: 28 U/L (ref 10–40)
ANION GAP SERPL CALCULATED.3IONS-SCNC: 17 MMOL/L (ref 3–16)
AST SERPL-CCNC: 26 U/L (ref 15–37)
BASOPHILS ABSOLUTE: 0.1 K/UL (ref 0–0.2)
BASOPHILS RELATIVE PERCENT: 1.5 %
BILIRUB SERPL-MCNC: 0.6 MG/DL (ref 0–1)
BUN BLDV-MCNC: 19 MG/DL (ref 7–20)
CALCIUM SERPL-MCNC: 9.8 MG/DL (ref 8.3–10.6)
CHLORIDE BLD-SCNC: 101 MMOL/L (ref 99–110)
CHOLESTEROL, TOTAL: 177 MG/DL (ref 0–199)
CO2: 23 MMOL/L (ref 21–32)
CREAT SERPL-MCNC: 1 MG/DL (ref 0.9–1.3)
CREATININE URINE: 115.2 MG/DL (ref 39–259)
EOSINOPHILS ABSOLUTE: 0.1 K/UL (ref 0–0.6)
EOSINOPHILS RELATIVE PERCENT: 2.8 %
GFR AFRICAN AMERICAN: >60
GFR NON-AFRICAN AMERICAN: >60
GLOBULIN: 1.7 G/DL
GLUCOSE BLD-MCNC: 139 MG/DL (ref 70–99)
HBA1C MFR BLD: 6.7 %
HCT VFR BLD CALC: 45.5 % (ref 40.5–52.5)
HDLC SERPL-MCNC: 39 MG/DL (ref 40–60)
HEMOGLOBIN: 15.8 G/DL (ref 13.5–17.5)
LDL CHOLESTEROL CALCULATED: 107 MG/DL
LYMPHOCYTES ABSOLUTE: 1.2 K/UL (ref 1–5.1)
LYMPHOCYTES RELATIVE PERCENT: 24 %
MCH RBC QN AUTO: 31.6 PG (ref 26–34)
MCHC RBC AUTO-ENTMCNC: 34.6 G/DL (ref 31–36)
MCV RBC AUTO: 91.3 FL (ref 80–100)
MICROALBUMIN UR-MCNC: <1.2 MG/DL
MICROALBUMIN/CREAT UR-RTO: NORMAL MG/G (ref 0–30)
MONOCYTES ABSOLUTE: 0.4 K/UL (ref 0–1.3)
MONOCYTES RELATIVE PERCENT: 8.8 %
NEUTROPHILS ABSOLUTE: 3.2 K/UL (ref 1.7–7.7)
NEUTROPHILS RELATIVE PERCENT: 62.9 %
PDW BLD-RTO: 12.9 % (ref 12.4–15.4)
PLATELET # BLD: 217 K/UL (ref 135–450)
PMV BLD AUTO: 8.8 FL (ref 5–10.5)
POTASSIUM SERPL-SCNC: 4.2 MMOL/L (ref 3.5–5.1)
PROSTATE SPECIFIC ANTIGEN: 1.41 NG/ML (ref 0–4)
RBC # BLD: 4.98 M/UL (ref 4.2–5.9)
SODIUM BLD-SCNC: 141 MMOL/L (ref 136–145)
TOTAL PROTEIN: 7.1 G/DL (ref 6.4–8.2)
TRIGL SERPL-MCNC: 153 MG/DL (ref 0–150)
TSH SERPL DL<=0.05 MIU/L-ACNC: 2.67 UIU/ML (ref 0.27–4.2)
VLDLC SERPL CALC-MCNC: 31 MG/DL
WBC # BLD: 5.1 K/UL (ref 4–11)

## 2019-11-01 PROCEDURE — 90686 IIV4 VACC NO PRSV 0.5 ML IM: CPT | Performed by: INTERNAL MEDICINE

## 2019-11-01 PROCEDURE — 99396 PREV VISIT EST AGE 40-64: CPT | Performed by: INTERNAL MEDICINE

## 2019-11-01 PROCEDURE — G8482 FLU IMMUNIZE ORDER/ADMIN: HCPCS | Performed by: INTERNAL MEDICINE

## 2019-11-01 PROCEDURE — 83036 HEMOGLOBIN GLYCOSYLATED A1C: CPT | Performed by: INTERNAL MEDICINE

## 2019-11-01 PROCEDURE — 90471 IMMUNIZATION ADMIN: CPT | Performed by: INTERNAL MEDICINE

## 2019-11-29 RX ORDER — SILDENAFIL CITRATE 20 MG/1
TABLET ORAL
Qty: 30 TABLET | Refills: 1 | Status: SHIPPED | OUTPATIENT
Start: 2019-11-29 | End: 2019-12-30

## 2019-12-30 RX ORDER — SILDENAFIL CITRATE 20 MG/1
TABLET ORAL
Qty: 30 TABLET | Refills: 0 | Status: SHIPPED | OUTPATIENT
Start: 2019-12-30 | End: 2021-04-12 | Stop reason: SDUPTHER

## 2020-01-18 RX ORDER — SILDENAFIL CITRATE 20 MG/1
TABLET ORAL
Qty: 30 TABLET | Refills: 0 | OUTPATIENT
Start: 2020-01-18

## 2020-01-18 NOTE — TELEPHONE ENCOUNTER
Medication:   Requested Prescriptions     Pending Prescriptions Disp Refills    sildenafil (REVATIO) 20 MG tablet [Pharmacy Med Name: SILDENAFIL 20 MG TABLET] 30 tablet 0     Sig: TAKE TWO TABLETS BY MOUTH DAILY AS NEEDED     Last Filled:  12/30/19    Last appt: 11/1/2019   Next appt: Visit date not found

## 2020-03-04 ENCOUNTER — TELEPHONE (OUTPATIENT)
Dept: PRIMARY CARE CLINIC | Age: 60
End: 2020-03-04

## 2020-03-04 ENCOUNTER — OFFICE VISIT (OUTPATIENT)
Dept: PRIMARY CARE CLINIC | Age: 60
End: 2020-03-04
Payer: COMMERCIAL

## 2020-03-04 VITALS
DIASTOLIC BLOOD PRESSURE: 84 MMHG | SYSTOLIC BLOOD PRESSURE: 136 MMHG | BODY MASS INDEX: 29.42 KG/M2 | OXYGEN SATURATION: 100 % | TEMPERATURE: 98.1 F | WEIGHT: 223 LBS | HEART RATE: 64 BPM

## 2020-03-04 PROCEDURE — 99213 OFFICE O/P EST LOW 20 MIN: CPT | Performed by: INTERNAL MEDICINE

## 2020-03-04 PROCEDURE — 3017F COLORECTAL CA SCREEN DOC REV: CPT | Performed by: INTERNAL MEDICINE

## 2020-03-04 PROCEDURE — G8417 CALC BMI ABV UP PARAM F/U: HCPCS | Performed by: INTERNAL MEDICINE

## 2020-03-04 PROCEDURE — 1036F TOBACCO NON-USER: CPT | Performed by: INTERNAL MEDICINE

## 2020-03-04 PROCEDURE — G8482 FLU IMMUNIZE ORDER/ADMIN: HCPCS | Performed by: INTERNAL MEDICINE

## 2020-03-04 PROCEDURE — G8427 DOCREV CUR MEDS BY ELIG CLIN: HCPCS | Performed by: INTERNAL MEDICINE

## 2020-03-04 RX ORDER — MONTELUKAST SODIUM 10 MG/1
10 TABLET ORAL DAILY
Qty: 30 TABLET | Refills: 3 | Status: SHIPPED | OUTPATIENT
Start: 2020-03-04 | End: 2020-07-02

## 2020-03-04 RX ORDER — AZITHROMYCIN 250 MG/1
250 TABLET, FILM COATED ORAL SEE ADMIN INSTRUCTIONS
Qty: 6 TABLET | Refills: 0 | Status: SHIPPED | OUTPATIENT
Start: 2020-03-04 | End: 2020-03-09

## 2020-03-04 SDOH — ECONOMIC STABILITY: TRANSPORTATION INSECURITY
IN THE PAST 12 MONTHS, HAS LACK OF TRANSPORTATION KEPT YOU FROM MEETINGS, WORK, OR FROM GETTING THINGS NEEDED FOR DAILY LIVING?: NO

## 2020-03-04 SDOH — ECONOMIC STABILITY: INCOME INSECURITY: HOW HARD IS IT FOR YOU TO PAY FOR THE VERY BASICS LIKE FOOD, HOUSING, MEDICAL CARE, AND HEATING?: NOT HARD AT ALL

## 2020-03-04 SDOH — ECONOMIC STABILITY: FOOD INSECURITY: WITHIN THE PAST 12 MONTHS, YOU WORRIED THAT YOUR FOOD WOULD RUN OUT BEFORE YOU GOT MONEY TO BUY MORE.: NEVER TRUE

## 2020-03-04 SDOH — ECONOMIC STABILITY: FOOD INSECURITY: WITHIN THE PAST 12 MONTHS, THE FOOD YOU BOUGHT JUST DIDN'T LAST AND YOU DIDN'T HAVE MONEY TO GET MORE.: NEVER TRUE

## 2020-03-04 SDOH — ECONOMIC STABILITY: TRANSPORTATION INSECURITY
IN THE PAST 12 MONTHS, HAS THE LACK OF TRANSPORTATION KEPT YOU FROM MEDICAL APPOINTMENTS OR FROM GETTING MEDICATIONS?: NO

## 2020-03-04 ASSESSMENT — PATIENT HEALTH QUESTIONNAIRE - PHQ9
SUM OF ALL RESPONSES TO PHQ QUESTIONS 1-9: 0
SUM OF ALL RESPONSES TO PHQ9 QUESTIONS 1 & 2: 0
SUM OF ALL RESPONSES TO PHQ QUESTIONS 1-9: 0
1. LITTLE INTEREST OR PLEASURE IN DOING THINGS: 0
2. FEELING DOWN, DEPRESSED OR HOPELESS: 0

## 2020-03-16 RX ORDER — CLOTRIMAZOLE 10 MG/1
10 LOZENGE ORAL; TOPICAL 3 TIMES DAILY
Qty: 15 TABLET | Refills: 0 | Status: SHIPPED | OUTPATIENT
Start: 2020-03-16 | End: 2020-03-21

## 2020-07-02 ENCOUNTER — TELEPHONE (OUTPATIENT)
Dept: ADMINISTRATIVE | Age: 60
End: 2020-07-02

## 2020-07-02 ENCOUNTER — OFFICE VISIT (OUTPATIENT)
Dept: PRIMARY CARE CLINIC | Age: 60
End: 2020-07-02
Payer: COMMERCIAL

## 2020-07-02 VITALS
TEMPERATURE: 98.7 F | DIASTOLIC BLOOD PRESSURE: 80 MMHG | OXYGEN SATURATION: 99 % | HEART RATE: 59 BPM | HEIGHT: 73 IN | WEIGHT: 216 LBS | SYSTOLIC BLOOD PRESSURE: 120 MMHG | RESPIRATION RATE: 15 BRPM | BODY MASS INDEX: 28.63 KG/M2

## 2020-07-02 LAB — HBA1C MFR BLD: 6.5 %

## 2020-07-02 PROCEDURE — 3017F COLORECTAL CA SCREEN DOC REV: CPT | Performed by: INTERNAL MEDICINE

## 2020-07-02 PROCEDURE — 83036 HEMOGLOBIN GLYCOSYLATED A1C: CPT | Performed by: INTERNAL MEDICINE

## 2020-07-02 PROCEDURE — 2022F DILAT RTA XM EVC RTNOPTHY: CPT | Performed by: INTERNAL MEDICINE

## 2020-07-02 PROCEDURE — G8427 DOCREV CUR MEDS BY ELIG CLIN: HCPCS | Performed by: INTERNAL MEDICINE

## 2020-07-02 PROCEDURE — 99214 OFFICE O/P EST MOD 30 MIN: CPT | Performed by: INTERNAL MEDICINE

## 2020-07-02 PROCEDURE — 3044F HG A1C LEVEL LT 7.0%: CPT | Performed by: INTERNAL MEDICINE

## 2020-07-02 PROCEDURE — 1036F TOBACCO NON-USER: CPT | Performed by: INTERNAL MEDICINE

## 2020-07-02 PROCEDURE — G8417 CALC BMI ABV UP PARAM F/U: HCPCS | Performed by: INTERNAL MEDICINE

## 2020-07-02 NOTE — TELEPHONE ENCOUNTER
Patient's spouse called today with patient stating his right ear is clogged. Was offered an appointment for Virtual , refused appointment. Patient would like Billy Anne MD  to schedule an in office ear irrigation.      pharmacy confirmed:[facility]. [facility] [address] [facility]    Patient was made aware of e-visits via [employer]

## 2020-07-02 NOTE — PROGRESS NOTES
Subjective:      Patient ID: Nanci Jay is a 60 y.o. male. HPI  Established patient here for a visit to manage acute and chronic medical conditions as detailed below. C/o bilateral wax impaction, cannot hear   Essential hypertension  This is a chronic problem. The problem is well controlled. Patient monitors readings regularly. Pertinent negatives include no chest pain, focal sensory loss, focal weakness, leg pain, myalgias or shortness of breath. No headaches or chest pain. Takes medications regularly. Blood pressure has been stable, blood work was reviewed, and advised patient to continue the current instructions or medications. Mixed hyperlipidemia  This has been a long standing problem, takes lipitor      Monitors diet and tries to follow a low fat diet. Has  been reasonably  compliant w exercise. Lipids have been stable, The problem is controlled. Recent lipid tests were reviewed and are normal. Pertinent negatives include no chest pain, focal sensory loss, focal weakness, leg pain, myalgias or shortness of breath. Advised patient to continue the current instructions or medications. Type 2 diabetes mellitus without complication, without long-term current use of insulin ([facility])  Diabetes Mellitus Type II:  Home blood sugar records reviewed: fasting range: 120-130. No significant episodes of hypoglycemia. Compliant with medications. No polyuria, polydipsia, visual changes, foot problems, GI upset. Diabetic diet compliance:  compliant most of the time. Current exercise: none. Will check labs, and refill medications as appropriate. Hypertension:  Denies CP, SOB, visual changes, dizziness, palpitations or HA. He is adherent to a low sodium diet. Blood pressure typically runs ok  outside of the office. Continue current medications. Hyperlipidemia:  No new myalgias or GI upset on current medications.        Lab Results   Component Value Date    LABA1C 6.7 11/01/2019    LABA1C 7.4 07/31/2019    LABA1C 8.6 02/06/2019     Lab Results   Component Value Date    LABMICR <1.20 11/01/2019    CREATININE 1.0 11/01/2019     Lab Results   Component Value Date    ALT 28 11/01/2019    AST 26 11/01/2019     No components found for: CHLPL  Lab Results   Component Value Date    TRIG 153 (H) 11/01/2019     Lab Results   Component Value Date    HDL 39 (L) 11/01/2019     Lab Results   Component Value Date    LDLCALC 107 11/01/2019    LDLDIRECT 120 03/22/2016         BP Readings from Last 2 Encounters:   07/02/20 120/80   03/04/20 136/84     Hemoglobin A1C (%)   Date Value   07/02/2020 6.5     Microscopic Examination (no units)   Date Value   09/19/2016 Not Indicated     Microalbumin, Random Urine (mg/dL)   Date Value   11/01/2019 <1.20     LDL Calculated (mg/dL)   Date Value   11/01/2019 107 (H)              Tobacco use:  Patient  reports that he has never smoked. He has never used smokeless tobacco.    If Smoker - Cessation materials given? NA    Is Daily aspirin on medication list?:  Yes    Diabetic retinal exam done? Yes   If yes, document in Health Maintenance. Monofilament placed on counter? Yes    Shoes and socks removed? Yes    BP taken with correct size cuff? Yes   Repeated if > 130/80 Yes     Microalbumin performed if applicable? Yes       Review of Systems  ROS: No unusual headaches or allergy symptoms or blurred vision. No prolonged cough. No flushing or facial pain or chest pain,dizziness, dyspnea, palpitations, or chest pain on exertion. No syncope. No nausea or vommitting or diarrhea. No jaundice or abdominal pain, change in bowel habits, black or bloody stools. No dysuria or hematuria or frequency of urination. No myalgias or muscle pain. No numbness, weakness, or tingling. No falls, or loss of consciousness. No weight loss or back pain. No falls. No paresthesias. No joint swelling or redness. No joint pain. No recent weight loss.  No focal weakness or sensory deficits or paresthesias, No confusion or altered sensorium. No hematemesis. No hearing loss. No siezures. All other systems were reviewed, and review was negative. Objective:   Physical Exam  /80 (Site: Left Upper Arm, Position: Sitting, Cuff Size: Medium Adult)   Pulse 59   Temp 98.7 °F (37.1 °C) (Oral)   Resp 15   Ht 6' 1\" (1.854 m)   Wt 216 lb (98 kg)   SpO2 99%   BMI 28.50 kg/m²    The physical exam reveals a patient who appears well, alert and oriented x 3, pleasant, cooperative. Vitals are as noted. Head is atraumatic and normocephalic. Eyes reveal normal conjunctiva, cornea normal, pupils are equal and rective to light. Nasal mucosa is normal. Throat is normal without exudates. Ears reveal normal tympanic membranes. Neck is supple and free of adenopathy, or masses. No thyromegaly. No jugular venous distension. Lungs are clear to auscultation, no rales or rhonchi noted. Heart sounds are regular , no murmurs, clicks, gallops or rubs. Abdomen is soft, no tenderness, masses or organomegaly. Bowel sounds are normally heard. Pelvis: normal. Extremities are normal. Peripheral pulses are normal. Screening neurological exam is normal without focal findings. Cranial nerves are intact, reflexes are symmetrical and muscle strength eaqual. Skin is normal without suspicious lesions noted. Assessment:      Essential hypertension  This is a chronic problem. The problem is well controlled. Patient monitors readings regularly. Pertinent negatives include no chest pain, focal sensory loss, focal weakness, leg pain, myalgias or shortness of breath. No headaches or chest pain. Takes medications regularly. Blood pressure has been stable, blood work was reviewed, and advised patient to continue the current instructions or medications. Mixed hyperlipidemia  This has been a long standing problem, takes lipitor      Monitors diet and tries to follow a low fat diet. Has  been reasonably  compliant w exercise.  Lipids have been stable, The problem is controlled. Recent lipid tests were reviewed and are normal. Pertinent negatives include no chest pain, focal sensory loss, focal weakness, leg pain, myalgias or shortness of breath. Advised patient to continue the current instructions or medications. Type 2 diabetes mellitus without complication, without long-term current use of insulin (HCC)  Diabetes Mellitus Type II:  Home blood sugar records reviewed: fasting range: 120-130. No significant episodes of hypoglycemia. Compliant with medications. No polyuria, polydipsia, visual changes, foot problems, GI upset. Diabetic diet compliance:  compliant most of the time. Current exercise: none. Will check labs, and refill medications as appropriate. Hypertension:  Denies CP, SOB, visual changes, dizziness, palpitations or HA. He is adherent to a low sodium diet. Blood pressure typically runs ok  outside of the office. Continue current medications. Hyperlipidemia:  No new myalgias or GI upset on current medications.        Lab Results   Component Value Date    LABA1C 6.7 11/01/2019    LABA1C 7.4 07/31/2019    LABA1C 8.6 02/06/2019     Lab Results   Component Value Date    LABMICR <1.20 11/01/2019    CREATININE 1.0 11/01/2019     Lab Results   Component Value Date    ALT 28 11/01/2019    AST 26 11/01/2019     No components found for: CHLPL  Lab Results   Component Value Date    TRIG 153 (H) 11/01/2019     Lab Results   Component Value Date    HDL 39 (L) 11/01/2019     Lab Results   Component Value Date    LDLCALC 107 11/01/2019    LDLDIRECT 120 03/22/2016         BP Readings from Last 2 Encounters:   07/02/20 120/80   03/04/20 136/84     Hemoglobin A1C (%)   Date Value   07/02/2020 6.5     Microscopic Examination (no units)   Date Value   09/19/2016 Not Indicated     Microalbumin, Random Urine (mg/dL)   Date Value   11/01/2019 <1.20     LDL Calculated (mg/dL)   Date Value   11/01/2019 107 (H)              Tobacco use:  Patient  reports that he has never smoked. He has never used smokeless tobacco.    If Smoker - Cessation materials given? NA    Is Daily aspirin on medication list?:  Yes    Diabetic retinal exam done? Yes   If yes, document in Health Maintenance. Monofilament placed on counter? Yes    Shoes and socks removed? Yes    BP taken with correct size cuff? Yes   Repeated if > 130/80 Yes     Microalbumin performed if applicable?   Yes     cerumen impaction- wax disimpacted,       Plan:      Needs blood work,   Refill meds,   Needs physical exam.           Sita Skaggs MD

## 2020-07-02 NOTE — ASSESSMENT & PLAN NOTE
Diabetes Mellitus Type II:  Home blood sugar records reviewed: fasting range: 120-130. No significant episodes of hypoglycemia. Compliant with medications. No polyuria, polydipsia, visual changes, foot problems, GI upset. Diabetic diet compliance:  compliant most of the time. Current exercise: none. Will check labs, and refill medications as appropriate. Hypertension:  Denies CP, SOB, visual changes, dizziness, palpitations or HA. He is adherent to a low sodium diet. Blood pressure typically runs ok  outside of the office. Continue current medications. Hyperlipidemia:  No new myalgias or GI upset on current medications. Lab Results   Component Value Date    LABA1C 6.7 11/01/2019    LABA1C 7.4 07/31/2019    LABA1C 8.6 02/06/2019     Lab Results   Component Value Date    LABMICR <1.20 11/01/2019    CREATININE 1.0 11/01/2019     Lab Results   Component Value Date    ALT 28 11/01/2019    AST 26 11/01/2019     No components found for: CHLPL  Lab Results   Component Value Date    TRIG 153 (H) 11/01/2019     Lab Results   Component Value Date    HDL 39 (L) 11/01/2019     Lab Results   Component Value Date    LDLCALC 107 11/01/2019    LDLDIRECT 120 03/22/2016         BP Readings from Last 2 Encounters:   07/02/20 120/80   03/04/20 136/84     Hemoglobin A1C (%)   Date Value   07/02/2020 6.5     Microscopic Examination (no units)   Date Value   09/19/2016 Not Indicated     Microalbumin, Random Urine (mg/dL)   Date Value   11/01/2019 <1.20     LDL Calculated (mg/dL)   Date Value   11/01/2019 107 (H)              Tobacco use:  Patient  reports that he has never smoked. He has never used smokeless tobacco.    If Smoker - Cessation materials given? NA    Is Daily aspirin on medication list?:  Yes    Diabetic retinal exam done? Yes   If yes, document in Health Maintenance. Monofilament placed on counter? Yes    Shoes and socks removed? Yes    BP taken with correct size cuff?  Yes   Repeated if > 130/80 Yes Microalbumin performed if applicable?   Yes

## 2020-07-06 RX ORDER — MONTELUKAST SODIUM 10 MG/1
TABLET ORAL
Qty: 30 TABLET | Refills: 2 | Status: SHIPPED | OUTPATIENT
Start: 2020-07-06 | End: 2020-10-06

## 2020-07-07 DIAGNOSIS — E11.9 TYPE 2 DIABETES MELLITUS WITHOUT COMPLICATION, WITHOUT LONG-TERM CURRENT USE OF INSULIN (HCC): ICD-10-CM

## 2020-07-08 LAB
A/G RATIO: 2.2 (ref 1.1–2.2)
ALBUMIN SERPL-MCNC: 4.7 G/DL (ref 3.4–5)
ALP BLD-CCNC: 84 U/L (ref 40–129)
ALT SERPL-CCNC: 23 U/L (ref 10–40)
ANION GAP SERPL CALCULATED.3IONS-SCNC: 14 MMOL/L (ref 3–16)
AST SERPL-CCNC: 23 U/L (ref 15–37)
BASOPHILS ABSOLUTE: 0.1 K/UL (ref 0–0.2)
BASOPHILS RELATIVE PERCENT: 1.1 %
BILIRUB SERPL-MCNC: 0.5 MG/DL (ref 0–1)
BUN BLDV-MCNC: 18 MG/DL (ref 7–20)
CALCIUM SERPL-MCNC: 9.9 MG/DL (ref 8.3–10.6)
CHLORIDE BLD-SCNC: 98 MMOL/L (ref 99–110)
CHOLESTEROL, TOTAL: 176 MG/DL (ref 0–199)
CO2: 26 MMOL/L (ref 21–32)
CREAT SERPL-MCNC: 1 MG/DL (ref 0.8–1.3)
EOSINOPHILS ABSOLUTE: 0.2 K/UL (ref 0–0.6)
EOSINOPHILS RELATIVE PERCENT: 2.8 %
ESTIMATED AVERAGE GLUCOSE: 139.9 MG/DL
GFR AFRICAN AMERICAN: >60
GFR NON-AFRICAN AMERICAN: >60
GLOBULIN: 2.1 G/DL
GLUCOSE BLD-MCNC: 157 MG/DL (ref 70–99)
HBA1C MFR BLD: 6.5 %
HCT VFR BLD CALC: 46.8 % (ref 40.5–52.5)
HDLC SERPL-MCNC: 42 MG/DL (ref 40–60)
HEMOGLOBIN: 15.8 G/DL (ref 13.5–17.5)
LDL CHOLESTEROL CALCULATED: 100 MG/DL
LYMPHOCYTES ABSOLUTE: 1.6 K/UL (ref 1–5.1)
LYMPHOCYTES RELATIVE PERCENT: 24.8 %
MCH RBC QN AUTO: 31.4 PG (ref 26–34)
MCHC RBC AUTO-ENTMCNC: 33.7 G/DL (ref 31–36)
MCV RBC AUTO: 93 FL (ref 80–100)
MONOCYTES ABSOLUTE: 0.6 K/UL (ref 0–1.3)
MONOCYTES RELATIVE PERCENT: 9.3 %
NEUTROPHILS ABSOLUTE: 3.9 K/UL (ref 1.7–7.7)
NEUTROPHILS RELATIVE PERCENT: 62 %
PDW BLD-RTO: 14.3 % (ref 12.4–15.4)
PLATELET # BLD: 237 K/UL (ref 135–450)
PMV BLD AUTO: 8.7 FL (ref 5–10.5)
POTASSIUM SERPL-SCNC: 4.3 MMOL/L (ref 3.5–5.1)
RBC # BLD: 5.03 M/UL (ref 4.2–5.9)
SODIUM BLD-SCNC: 138 MMOL/L (ref 136–145)
TOTAL PROTEIN: 6.8 G/DL (ref 6.4–8.2)
TRIGL SERPL-MCNC: 170 MG/DL (ref 0–150)
TSH SERPL DL<=0.05 MIU/L-ACNC: 2.69 UIU/ML (ref 0.27–4.2)
VLDLC SERPL CALC-MCNC: 34 MG/DL
WBC # BLD: 6.3 K/UL (ref 4–11)

## 2020-07-29 RX ORDER — AMLODIPINE BESYLATE 10 MG/1
TABLET ORAL
Qty: 90 TABLET | Refills: 2 | Status: SHIPPED | OUTPATIENT
Start: 2020-07-29 | End: 2021-01-04 | Stop reason: SDUPTHER

## 2020-07-29 RX ORDER — LISINOPRIL AND HYDROCHLOROTHIAZIDE 25; 20 MG/1; MG/1
TABLET ORAL
Qty: 90 TABLET | Refills: 2 | Status: SHIPPED | OUTPATIENT
Start: 2020-07-29 | End: 2021-01-04 | Stop reason: SDUPTHER

## 2020-07-29 NOTE — TELEPHONE ENCOUNTER
Medication:   Requested Prescriptions     Pending Prescriptions Disp Refills    amLODIPine (NORVASC) 10 MG tablet [Pharmacy Med Name: amLODIPine BESYLATE 10 MG TAB] 90 tablet 2     Sig: TAKE ONE TABLET BY MOUTH DAILY    lisinopril-hydroCHLOROthiazide (PRINZIDE;ZESTORETIC) 20-25 MG per tablet [Pharmacy Med Name: LISINOPRIL-HCTZ 20-25 MG TAB] 90 tablet 2     Sig: TAKE ONE TABLET BY MOUTH DAILY     Last Filled:  07/31/2019    Last appt: 7/2/2020   Next appt: 1/4/2021    Last OARRS: No flowsheet data found.

## 2020-08-24 NOTE — TELEPHONE ENCOUNTER
Medication:   Requested Prescriptions     Pending Prescriptions Disp Refills    metFORMIN (GLUCOPHAGE) 500 MG tablet [Pharmacy Med Name: metFORMIN  MG TABLET] 180 tablet 2     Sig: TAKE ONE TABLET BY MOUTH TWICE A DAY WITH MEALS     Last Filled:  07/31/2019    Last appt: 7/2/2020   Next appt: 1/4/2021    Last OARRS: No flowsheet data found.

## 2020-09-02 RX ORDER — ATORVASTATIN CALCIUM 80 MG/1
TABLET, FILM COATED ORAL
Qty: 90 TABLET | Refills: 2 | Status: SHIPPED | OUTPATIENT
Start: 2020-09-02 | End: 2021-01-04 | Stop reason: SDUPTHER

## 2020-09-02 NOTE — TELEPHONE ENCOUNTER
Medication:   Requested Prescriptions     Pending Prescriptions Disp Refills    atorvastatin (LIPITOR) 80 MG tablet [Pharmacy Med Name: ATORVASTATIN 80 MG TABLET] 90 tablet 2     Sig: TAKE ONE TABLET BY MOUTH DAILY     Last Filled:  07/31/2019    Last appt: 7/2/2020   Next appt: 1/4/2021    Last OARRS: No flowsheet data found.

## 2020-10-06 RX ORDER — MONTELUKAST SODIUM 10 MG/1
TABLET ORAL
Qty: 30 TABLET | Refills: 1 | Status: SHIPPED | OUTPATIENT
Start: 2020-10-06 | End: 2020-12-09

## 2020-12-09 RX ORDER — MONTELUKAST SODIUM 10 MG/1
TABLET ORAL
Qty: 30 TABLET | Refills: 0 | Status: SHIPPED | OUTPATIENT
Start: 2020-12-09 | End: 2021-01-04 | Stop reason: SDUPTHER

## 2020-12-09 NOTE — TELEPHONE ENCOUNTER
Medication:   Requested Prescriptions     Pending Prescriptions Disp Refills    montelukast (SINGULAIR) 10 MG tablet [Pharmacy Med Name: MONTELUKAST SOD 10 MG TABLET] 30 tablet 0     Sig: TAKE ONE TABLET BY MOUTH DAILY     Last Filled: 10/06/20    Last appt: 7/2/2020   Next appt: 1/4/2021    Last OARRS: No flowsheet data found.

## 2021-01-04 ENCOUNTER — OFFICE VISIT (OUTPATIENT)
Dept: PRIMARY CARE CLINIC | Age: 61
End: 2021-01-04
Payer: COMMERCIAL

## 2021-01-04 VITALS
OXYGEN SATURATION: 98 % | HEART RATE: 61 BPM | WEIGHT: 217 LBS | BODY MASS INDEX: 29.39 KG/M2 | TEMPERATURE: 97.3 F | HEIGHT: 72 IN | DIASTOLIC BLOOD PRESSURE: 88 MMHG | SYSTOLIC BLOOD PRESSURE: 138 MMHG | RESPIRATION RATE: 14 BRPM

## 2021-01-04 DIAGNOSIS — E11.9 TYPE 2 DIABETES MELLITUS WITHOUT COMPLICATION, WITHOUT LONG-TERM CURRENT USE OF INSULIN (HCC): Primary | ICD-10-CM

## 2021-01-04 DIAGNOSIS — E78.2 MIXED HYPERLIPIDEMIA: ICD-10-CM

## 2021-01-04 DIAGNOSIS — E11.9 TYPE 2 DIABETES MELLITUS WITHOUT COMPLICATION, WITHOUT LONG-TERM CURRENT USE OF INSULIN (HCC): ICD-10-CM

## 2021-01-04 DIAGNOSIS — I10 ESSENTIAL HYPERTENSION: ICD-10-CM

## 2021-01-04 LAB
CREATININE URINE: 25 MG/DL (ref 39–259)
HBA1C MFR BLD: 6.2 %
MICROALBUMIN UR-MCNC: <1.2 MG/DL
MICROALBUMIN/CREAT UR-RTO: ABNORMAL MG/G (ref 0–30)

## 2021-01-04 PROCEDURE — 2022F DILAT RTA XM EVC RTNOPTHY: CPT | Performed by: INTERNAL MEDICINE

## 2021-01-04 PROCEDURE — 99396 PREV VISIT EST AGE 40-64: CPT | Performed by: INTERNAL MEDICINE

## 2021-01-04 PROCEDURE — G8427 DOCREV CUR MEDS BY ELIG CLIN: HCPCS | Performed by: INTERNAL MEDICINE

## 2021-01-04 PROCEDURE — 3017F COLORECTAL CA SCREEN DOC REV: CPT | Performed by: INTERNAL MEDICINE

## 2021-01-04 PROCEDURE — 99213 OFFICE O/P EST LOW 20 MIN: CPT | Performed by: INTERNAL MEDICINE

## 2021-01-04 PROCEDURE — G8484 FLU IMMUNIZE NO ADMIN: HCPCS | Performed by: INTERNAL MEDICINE

## 2021-01-04 PROCEDURE — 3046F HEMOGLOBIN A1C LEVEL >9.0%: CPT | Performed by: INTERNAL MEDICINE

## 2021-01-04 PROCEDURE — G8417 CALC BMI ABV UP PARAM F/U: HCPCS | Performed by: INTERNAL MEDICINE

## 2021-01-04 PROCEDURE — 1036F TOBACCO NON-USER: CPT | Performed by: INTERNAL MEDICINE

## 2021-01-04 PROCEDURE — 83036 HEMOGLOBIN GLYCOSYLATED A1C: CPT | Performed by: INTERNAL MEDICINE

## 2021-01-04 RX ORDER — AMLODIPINE BESYLATE 10 MG/1
TABLET ORAL
Qty: 90 TABLET | Refills: 1 | Status: SHIPPED | OUTPATIENT
Start: 2021-01-04 | End: 2021-09-30

## 2021-01-04 RX ORDER — ATORVASTATIN CALCIUM 80 MG/1
TABLET, FILM COATED ORAL
Qty: 90 TABLET | Refills: 1 | Status: SHIPPED | OUTPATIENT
Start: 2021-01-04 | End: 2021-12-06

## 2021-01-04 RX ORDER — MONTELUKAST SODIUM 10 MG/1
TABLET ORAL
Qty: 90 TABLET | Refills: 1 | Status: SHIPPED | OUTPATIENT
Start: 2021-01-04 | End: 2021-07-06

## 2021-01-04 RX ORDER — LISINOPRIL AND HYDROCHLOROTHIAZIDE 25; 20 MG/1; MG/1
TABLET ORAL
Qty: 90 TABLET | Refills: 1 | Status: SHIPPED | OUTPATIENT
Start: 2021-01-04 | End: 2021-11-08

## 2021-01-04 ASSESSMENT — PATIENT HEALTH QUESTIONNAIRE - PHQ9
2. FEELING DOWN, DEPRESSED OR HOPELESS: 0
SUM OF ALL RESPONSES TO PHQ QUESTIONS 1-9: 0
SUM OF ALL RESPONSES TO PHQ QUESTIONS 1-9: 0

## 2021-01-04 NOTE — PROGRESS NOTES
Subjective:      Patient ID: Teddy Morrissey is a 63 y.o. male. HPI  Here for a physical     Type 2 diabetes mellitus without complication, without long-term current use of insulin (HCC)  Diabetes Mellitus Type II:  Home blood sugar records reviewed: fasting range: 100-120. No significant episodes of hypoglycemia. Compliant with medications. No polyuria, polydipsia, visual changes, foot problems, GI upset. Diabetic diet compliance:  compliant most of the time. Current exercise: none. Will check labs, and refill medications as appropriate. Hypertension:  Denies CP, SOB, visual changes, dizziness, palpitations or HA. He is adherent to a low sodium diet. Blood pressure typically runs ok  outside of the office. Continue current medications. Hyperlipidemia:  No new myalgias or GI upset on current medications. Lab Results   Component Value Date    LABA1C 6.5 07/07/2020    LABA1C 6.5 07/02/2020    LABA1C 6.7 11/01/2019     Lab Results   Component Value Date    LABMICR <1.20 11/01/2019    CREATININE 1.0 07/07/2020     Lab Results   Component Value Date    ALT 23 07/07/2020    AST 23 07/07/2020     No components found for: CHLPL  Lab Results   Component Value Date    TRIG 170 (H) 07/07/2020     Lab Results   Component Value Date    HDL 42 07/07/2020     Lab Results   Component Value Date    LDLCALC 100 07/07/2020    LDLDIRECT 120 03/22/2016        BP Readings from Last 2 Encounters:   01/04/21 138/88   07/02/20 120/80     Hemoglobin A1C (%)   Date Value   07/07/2020 6.5     Microscopic Examination (no units)   Date Value   09/19/2016 Not Indicated     Microalbumin, Random Urine (mg/dL)   Date Value   11/01/2019 <1.20     LDL Calculated (mg/dL)   Date Value   07/07/2020 100 (H)              Tobacco use:  Patient  reports that he has never smoked. He has never used smokeless tobacco.    If Smoker - Cessation materials given?  NA    Is Daily aspirin on medication list?:  Yes Diabetic retinal exam done? Yes   If yes, document in Health Maintenance. Monofilament placed on counter? Yes    Shoes and socks removed? Yes    BP taken with correct size cuff? Yes   Repeated if > 130/80 Yes     Microalbumin performed if applicable? Yes       Essential hypertension  This is a chronic problem. The problem is well controlled. Patient monitors readings regularly. Pertinent negatives include no chest pain, focal sensory loss, focal weakness, leg pain, myalgias or shortness of breath. No headaches or chest pain. Takes medications regularly. Blood pressure has been stable, blood work was reviewed, and advised patient to continue the current instructions or medications. Mixed hyperlipidemia  This has been a long standing problem, takes lipitor      Monitors diet and tries to follow a low fat diet. Has  been reasonably  compliant w exercise. Lipids have been stable, The problem is controlled. Recent lipid tests were reviewed and are normal. Pertinent negatives include no chest pain, focal sensory loss, focal weakness, leg pain, myalgias or shortness of breath. Advised patient to continue the current instructions or medications.        Review of Systems ROS: No unusual headaches or allergy symptoms or blurred vision. No prolonged cough. No flushing or facial pain or chest pain,dizziness, dyspnea, palpitations, or chest pain on exertion. No syncope. No nausea or vommitting or diarrhea. No jaundice or abdominal pain, change in bowel habits, black or bloody stools. No dysuria or hematuria or frequency of urination. No myalgias or muscle pain. No numbness, weakness, or tingling. No falls, or loss of consciousness. No weight loss or back pain. No falls. No paresthesias. No joint swelling or redness. No joint pain. No recent weight loss. No focal weakness or sensory deficits or paresthesias, No confusion or altered sensorium. No hematemesis. No hearing loss. No siezures. All other systems were reviewed, and review was negative. Objective:   Physical Exam  /88 (Site: Left Upper Arm, Position: Sitting, Cuff Size: Medium Adult)   Pulse 61   Temp 97.3 °F (36.3 °C) (Skin)   Resp 14   Ht 6' (1.829 m)   Wt 217 lb (98.4 kg)   SpO2 98%   BMI 29.43 kg/m²    The physical exam reveals a patient who appears well, alert and oriented x 3, pleasant, cooperative. Vitals are as noted. Head is atraumatic and normocephalic. Eyes reveal normal conjunctiva, cornea normal, pupils are equal and rective to light. Nasal mucosa is normal. Throat is normal without exudates. Ears reveal normal tympanic membranes. Neck is supple and free of adenopathy, or masses. No thyromegaly. No jugular venous distension. Lungs are clear to auscultation, no rales or rhonchi noted. Heart sounds are regular , no murmurs, clicks, gallops or rubs. Abdomen is soft, no tenderness, masses or organomegaly. Bowel sounds are normally heard. Pelvis: normal. Extremities are normal. Peripheral pulses are normal. Screening neurological exam is normal without focal findings. Cranial nerves are intact, reflexes are symmetrical and muscle strength eaqual. Skin is normal without suspicious lesions noted. Assessment:       Physical exam  Type 2 diabetes mellitus without complication, without long-term current use of insulin (HCC)  Diabetes Mellitus Type II:  Home blood sugar records reviewed: fasting range: 100-120. No significant episodes of hypoglycemia. Compliant with medications. No polyuria, polydipsia, visual changes, foot problems, GI upset. Diabetic diet compliance:  compliant most of the time. Current exercise: none. Will check labs, and refill medications as appropriate. Hypertension:  Denies CP, SOB, visual changes, dizziness, palpitations or HA. He is adherent to a low sodium diet. Blood pressure typically runs ok  outside of the office. Continue current medications. Hyperlipidemia:  No new myalgias or GI upset on current medications. Lab Results   Component Value Date    LABA1C 6.5 07/07/2020    LABA1C 6.5 07/02/2020    LABA1C 6.7 11/01/2019     Lab Results   Component Value Date    LABMICR <1.20 11/01/2019    CREATININE 1.0 07/07/2020     Lab Results   Component Value Date    ALT 23 07/07/2020    AST 23 07/07/2020     No components found for: CHLPL  Lab Results   Component Value Date    TRIG 170 (H) 07/07/2020     Lab Results   Component Value Date    HDL 42 07/07/2020     Lab Results   Component Value Date    LDLCALC 100 07/07/2020    LDLDIRECT 120 03/22/2016        BP Readings from Last 2 Encounters:   01/04/21 138/88   07/02/20 120/80     Hemoglobin A1C (%)   Date Value   07/07/2020 6.5     Microscopic Examination (no units)   Date Value   09/19/2016 Not Indicated     Microalbumin, Random Urine (mg/dL)   Date Value   11/01/2019 <1.20     LDL Calculated (mg/dL)   Date Value   07/07/2020 100 (H)              Tobacco use:  Patient  reports that he has never smoked. He has never used smokeless tobacco.    If Smoker - Cessation materials given? NA    Is Daily aspirin on medication list?:  Yes    Diabetic retinal exam done? Yes   If yes, document in Health Maintenance.

## 2021-01-04 NOTE — ASSESSMENT & PLAN NOTE
This has been a long standing problem, takes lipitor      Monitors diet and tries to follow a low fat diet. Has  been reasonably  compliant w exercise. Lipids have been stable, The problem is controlled. Recent lipid tests were reviewed and are normal. Pertinent negatives include no chest pain, focal sensory loss, focal weakness, leg pain, myalgias or shortness of breath. Advised patient to continue the current instructions or medications.
This is a chronic problem. The problem is well controlled. Patient monitors readings regularly. Pertinent negatives include no chest pain, focal sensory loss, focal weakness, leg pain, myalgias or shortness of breath. No headaches or chest pain. Takes medications regularly. Blood pressure has been stable, blood work was reviewed, and advised patient to continue the current instructions or medications.
Microalbumin performed if applicable?   Yes

## 2021-02-03 ENCOUNTER — TELEPHONE (OUTPATIENT)
Dept: PRIMARY CARE CLINIC | Age: 61
End: 2021-02-03

## 2021-02-03 NOTE — TELEPHONE ENCOUNTER
Pt been seen on 1.4.21 for Physical but does not been coded as Physical. Pt and his insurance company called to ask for clarification. Please call him back.

## 2021-03-29 RX ORDER — CLOTRIMAZOLE 10 MG/1
10 LOZENGE ORAL; TOPICAL 3 TIMES DAILY
Qty: 21 TABLET | Refills: 0 | Status: SHIPPED | OUTPATIENT
Start: 2021-03-29 | End: 2021-04-05

## 2021-04-12 RX ORDER — SILDENAFIL CITRATE 20 MG/1
TABLET ORAL
Qty: 30 TABLET | Refills: 1 | Status: SHIPPED | OUTPATIENT
Start: 2021-04-12 | End: 2021-05-14

## 2021-04-12 NOTE — TELEPHONE ENCOUNTER
Medication:   Requested Prescriptions     Pending Prescriptions Disp Refills    sildenafil (REVATIO) 20 MG tablet 30 tablet 0        Last Filled:      Patient Phone Number: [phone] (home)     Last appt: 1/4/2021   Next appt: 7/6/2021    Last OARRS: No flowsheet data found.     Preferred Pharmacy:   [address] [place], [place] [address]a[facility]rit[facility]ers [address], [address]  Phone: [phone] Fax: [address] [place] [address]  Phone: [phone] Fax: [phone]

## 2021-05-14 RX ORDER — SILDENAFIL CITRATE 20 MG/1
TABLET ORAL
Qty: 30 TABLET | Refills: 0 | Status: SHIPPED | OUTPATIENT
Start: 2021-05-14 | End: 2021-05-28

## 2021-05-14 NOTE — TELEPHONE ENCOUNTER
Medication:   Requested Prescriptions     Pending Prescriptions Disp Refills    sildenafil (REVATIO) 20 MG tablet [Pharmacy Med Name: SILDENAFIL 20 MG TABLET] 30 tablet 0     Sig: TAKE TWO TABLETS BY MOUTH DAILY AS NEEDED     Last Filled:  04/12/21    Last appt: 1/4/2021   Next appt: 7/6/2021    Last OARRS: No flowsheet data found.

## 2021-05-28 RX ORDER — SILDENAFIL CITRATE 20 MG/1
TABLET ORAL
Qty: 30 TABLET | Refills: 0 | Status: SHIPPED | OUTPATIENT
Start: 2021-05-28 | End: 2021-06-16

## 2021-06-16 ENCOUNTER — OFFICE VISIT (OUTPATIENT)
Dept: PRIMARY CARE CLINIC | Age: 61
End: 2021-06-16
Payer: COMMERCIAL

## 2021-06-16 VITALS
WEIGHT: 218 LBS | DIASTOLIC BLOOD PRESSURE: 80 MMHG | RESPIRATION RATE: 14 BRPM | SYSTOLIC BLOOD PRESSURE: 114 MMHG | HEART RATE: 51 BPM | OXYGEN SATURATION: 99 % | BODY MASS INDEX: 29.57 KG/M2 | TEMPERATURE: 97.4 F

## 2021-06-16 DIAGNOSIS — Z12.5 SCREENING FOR PROSTATE CANCER: ICD-10-CM

## 2021-06-16 DIAGNOSIS — E11.9 TYPE 2 DIABETES MELLITUS WITHOUT COMPLICATION, WITHOUT LONG-TERM CURRENT USE OF INSULIN (HCC): Primary | ICD-10-CM

## 2021-06-16 DIAGNOSIS — I10 ESSENTIAL HYPERTENSION: ICD-10-CM

## 2021-06-16 DIAGNOSIS — E11.9 TYPE 2 DIABETES MELLITUS WITHOUT COMPLICATION, WITHOUT LONG-TERM CURRENT USE OF INSULIN (HCC): ICD-10-CM

## 2021-06-16 DIAGNOSIS — E78.2 MIXED HYPERLIPIDEMIA: ICD-10-CM

## 2021-06-16 LAB
A/G RATIO: 2.4 (ref 1.1–2.2)
ALBUMIN SERPL-MCNC: 4.7 G/DL (ref 3.4–5)
ALP BLD-CCNC: 96 U/L (ref 40–129)
ALT SERPL-CCNC: 21 U/L (ref 10–40)
ANION GAP SERPL CALCULATED.3IONS-SCNC: 13 MMOL/L (ref 3–16)
AST SERPL-CCNC: 20 U/L (ref 15–37)
BASOPHILS ABSOLUTE: 0 K/UL (ref 0–0.2)
BASOPHILS RELATIVE PERCENT: 0.9 %
BILIRUB SERPL-MCNC: 0.4 MG/DL (ref 0–1)
BUN BLDV-MCNC: 17 MG/DL (ref 7–20)
CALCIUM SERPL-MCNC: 9.6 MG/DL (ref 8.3–10.6)
CHLORIDE BLD-SCNC: 103 MMOL/L (ref 99–110)
CHOLESTEROL, TOTAL: 164 MG/DL (ref 0–199)
CO2: 23 MMOL/L (ref 21–32)
CREAT SERPL-MCNC: 0.9 MG/DL (ref 0.8–1.3)
CREATININE URINE: 73.8 MG/DL (ref 39–259)
EOSINOPHILS ABSOLUTE: 0.2 K/UL (ref 0–0.6)
EOSINOPHILS RELATIVE PERCENT: 4.1 %
GFR AFRICAN AMERICAN: >60
GFR NON-AFRICAN AMERICAN: >60
GLOBULIN: 2 G/DL
GLUCOSE BLD-MCNC: 151 MG/DL (ref 70–99)
HBA1C MFR BLD: 6.3 %
HCT VFR BLD CALC: 42.6 % (ref 40.5–52.5)
HDLC SERPL-MCNC: 34 MG/DL (ref 40–60)
HEMOGLOBIN: 15 G/DL (ref 13.5–17.5)
LDL CHOLESTEROL CALCULATED: 79 MG/DL
LYMPHOCYTES ABSOLUTE: 1.3 K/UL (ref 1–5.1)
LYMPHOCYTES RELATIVE PERCENT: 24.6 %
MCH RBC QN AUTO: 31.9 PG (ref 26–34)
MCHC RBC AUTO-ENTMCNC: 35.2 G/DL (ref 31–36)
MCV RBC AUTO: 90.8 FL (ref 80–100)
MICROALBUMIN UR-MCNC: <1.2 MG/DL
MICROALBUMIN/CREAT UR-RTO: NORMAL MG/G (ref 0–30)
MONOCYTES ABSOLUTE: 0.5 K/UL (ref 0–1.3)
MONOCYTES RELATIVE PERCENT: 9.2 %
NEUTROPHILS ABSOLUTE: 3.3 K/UL (ref 1.7–7.7)
NEUTROPHILS RELATIVE PERCENT: 61.2 %
PDW BLD-RTO: 12.4 % (ref 12.4–15.4)
PLATELET # BLD: 198 K/UL (ref 135–450)
PMV BLD AUTO: 8.6 FL (ref 5–10.5)
POTASSIUM SERPL-SCNC: 3.9 MMOL/L (ref 3.5–5.1)
PROSTATE SPECIFIC ANTIGEN: 1.03 NG/ML (ref 0–4)
RBC # BLD: 4.7 M/UL (ref 4.2–5.9)
SODIUM BLD-SCNC: 139 MMOL/L (ref 136–145)
TOTAL PROTEIN: 6.7 G/DL (ref 6.4–8.2)
TRIGL SERPL-MCNC: 253 MG/DL (ref 0–150)
TSH SERPL DL<=0.05 MIU/L-ACNC: 2.74 UIU/ML (ref 0.27–4.2)
VLDLC SERPL CALC-MCNC: 51 MG/DL
WBC # BLD: 5.4 K/UL (ref 4–11)

## 2021-06-16 PROCEDURE — 1036F TOBACCO NON-USER: CPT | Performed by: INTERNAL MEDICINE

## 2021-06-16 PROCEDURE — G8427 DOCREV CUR MEDS BY ELIG CLIN: HCPCS | Performed by: INTERNAL MEDICINE

## 2021-06-16 PROCEDURE — 3044F HG A1C LEVEL LT 7.0%: CPT | Performed by: INTERNAL MEDICINE

## 2021-06-16 PROCEDURE — 99214 OFFICE O/P EST MOD 30 MIN: CPT | Performed by: INTERNAL MEDICINE

## 2021-06-16 PROCEDURE — G8417 CALC BMI ABV UP PARAM F/U: HCPCS | Performed by: INTERNAL MEDICINE

## 2021-06-16 PROCEDURE — 2022F DILAT RTA XM EVC RTNOPTHY: CPT | Performed by: INTERNAL MEDICINE

## 2021-06-16 PROCEDURE — 83036 HEMOGLOBIN GLYCOSYLATED A1C: CPT | Performed by: INTERNAL MEDICINE

## 2021-06-16 PROCEDURE — 3017F COLORECTAL CA SCREEN DOC REV: CPT | Performed by: INTERNAL MEDICINE

## 2021-06-16 RX ORDER — SILDENAFIL CITRATE 20 MG/1
TABLET ORAL
Qty: 30 TABLET | Refills: 0 | Status: SHIPPED | OUTPATIENT
Start: 2021-06-16 | End: 2021-07-01

## 2021-06-16 SDOH — ECONOMIC STABILITY: FOOD INSECURITY: WITHIN THE PAST 12 MONTHS, THE FOOD YOU BOUGHT JUST DIDN'T LAST AND YOU DIDN'T HAVE MONEY TO GET MORE.: NEVER TRUE

## 2021-06-16 SDOH — ECONOMIC STABILITY: FOOD INSECURITY: WITHIN THE PAST 12 MONTHS, YOU WORRIED THAT YOUR FOOD WOULD RUN OUT BEFORE YOU GOT MONEY TO BUY MORE.: NEVER TRUE

## 2021-06-16 ASSESSMENT — SOCIAL DETERMINANTS OF HEALTH (SDOH): HOW HARD IS IT FOR YOU TO PAY FOR THE VERY BASICS LIKE FOOD, HOUSING, MEDICAL CARE, AND HEATING?: NOT HARD AT ALL

## 2021-06-16 NOTE — PROGRESS NOTES
Subjective:      Patient ID: Meri Gallegos is a 63 y.o. male. HPI  Established patient here for a visit to manage acute and chronic medical conditions as detailed below. Type 2 diabetes mellitus without complication, without long-term current use of insulin (HCC)  Diabetes Mellitus Type II:  Home blood sugar records reviewed: fasting range: 120-130. No significant episodes of hypoglycemia. Compliant with medications. No polyuria, polydipsia, visual changes, foot problems, GI upset. Diabetic diet compliance:  compliant most of the time. Current exercise: none. Will check labs, and refill medications as appropriate. Hypertension:  Denies CP, SOB, visual changes, dizziness, palpitations or HA. He is adherent to a low sodium diet. Blood pressure typically runs ok  outside of the office. Continue current medications. Hyperlipidemia:  No new myalgias or GI upset on current medications. Lab Results   Component Value Date    LABA1C 6.2 01/04/2021    LABA1C 6.5 07/07/2020    LABA1C 6.5 07/02/2020     Lab Results   Component Value Date    LABMICR <1.20 01/04/2021    CREATININE 1.0 07/07/2020     Lab Results   Component Value Date    ALT 23 07/07/2020    AST 23 07/07/2020     No components found for: CHLPL  Lab Results   Component Value Date    TRIG 170 (H) 07/07/2020     Lab Results   Component Value Date    HDL 42 07/07/2020     Lab Results   Component Value Date    LDLCALC 100 07/07/2020    LDLDIRECT 120 03/22/2016         BP Readings from Last 2 Encounters:   06/16/21 114/80   01/04/21 138/88     Hemoglobin A1C (%)   Date Value   01/04/2021 6.2     Microscopic Examination (no units)   Date Value   09/19/2016 Not Indicated     Microalbumin, Random Urine (mg/dL)   Date Value   01/04/2021 <1.20     LDL Calculated (mg/dL)   Date Value   07/07/2020 100 (H)              Tobacco use:  Patient  reports that he has never smoked.  He has never used smokeless tobacco.    If Smoker - Cessation materials hematemesis. No hearing loss. No siezures. All other systems were reviewed, and review was negative. Objective:   Physical Exam  /80 (Site: Left Upper Arm, Position: Sitting, Cuff Size: Medium Adult)   Pulse 51   Temp 97.4 °F (36.3 °C)   Resp 14   Wt 218 lb (98.9 kg)   SpO2 99%   BMI 29.57 kg/m²    The physical exam reveals a patient who appears well, alert and oriented x 3, pleasant, cooperative. Vitals are as noted. Head is atraumatic and normocephalic. Eyes reveal normal conjunctiva, cornea normal, pupils are equal and rective to light. Nasal mucosa is normal. Throat is normal without exudates. Ears reveal normal tympanic membranes. Neck is supple and free of adenopathy, or masses. No thyromegaly. No jugular venous distension. Lungs are clear to auscultation, no rales or rhonchi noted. Heart sounds are regular , no murmurs, clicks, gallops or rubs. Abdomen is soft, no tenderness, masses or organomegaly. Bowel sounds are normally heard. Pelvis: normal. Extremities are normal. Peripheral pulses are normal. Screening neurological exam is normal without focal findings. Cranial nerves are intact, reflexes are symmetrical and muscle strength eaqual. Skin is normal without suspicious lesions noted. Assessment:      Type 2 diabetes mellitus without complication, without long-term current use of insulin (HCC)  Diabetes Mellitus Type II:  Home blood sugar records reviewed: fasting range: 120-130. No significant episodes of hypoglycemia. Compliant with medications. No polyuria, polydipsia, visual changes, foot problems, GI upset. Diabetic diet compliance:  compliant most of the time. Current exercise: none. Will check labs, and refill medications as appropriate. Hypertension:  Denies CP, SOB, visual changes, dizziness, palpitations or HA. He is adherent to a low sodium diet. Blood pressure typically runs ok  outside of the office. Continue current medications.     Hyperlipidemia:  No new myalgias or GI upset on current medications. Lab Results   Component Value Date    LABA1C 6.2 01/04/2021    LABA1C 6.5 07/07/2020    LABA1C 6.5 07/02/2020     Lab Results   Component Value Date    LABMICR <1.20 01/04/2021    CREATININE 1.0 07/07/2020     Lab Results   Component Value Date    ALT 23 07/07/2020    AST 23 07/07/2020     No components found for: CHLPL  Lab Results   Component Value Date    TRIG 170 (H) 07/07/2020     Lab Results   Component Value Date    HDL 42 07/07/2020     Lab Results   Component Value Date    LDLCALC 100 07/07/2020    LDLDIRECT 120 03/22/2016         BP Readings from Last 2 Encounters:   06/16/21 114/80   01/04/21 138/88     Hemoglobin A1C (%)   Date Value   01/04/2021 6.2     Microscopic Examination (no units)   Date Value   09/19/2016 Not Indicated     Microalbumin, Random Urine (mg/dL)   Date Value   01/04/2021 <1.20     LDL Calculated (mg/dL)   Date Value   07/07/2020 100 (H)              Tobacco use:  Patient  reports that he has never smoked. He has never used smokeless tobacco.    If Smoker - Cessation materials given? NA    Is Daily aspirin on medication list?:  Yes    Diabetic retinal exam done? Yes   If yes, document in Health Maintenance. Monofilament placed on counter? Yes    Shoes and socks removed? Yes    BP taken with correct size cuff? Yes   Repeated if > 130/80 Yes     Microalbumin performed if applicable? Yes       Essential hypertension  This is a chronic problem. The problem is well controlled. Patient monitors readings regularly. Pertinent negatives include no chest pain, focal sensory loss, focal weakness, leg pain, myalgias or shortness of breath. No headaches or chest pain. Takes medications regularly. Blood pressure has been stable, blood work was reviewed, and advised patient to continue the current instructions or medications.        Mixed hyperlipidemia  This has been a long standing problem, takes lipitor      Monitors diet and tries to follow a low fat diet. Has  been reasonably  compliant w exercise. Lipids have been stable, The problem is controlled. Recent lipid tests were reviewed and are normal. Pertinent negatives include no chest pain, focal sensory loss, focal weakness, leg pain, myalgias or shortness of breath. Advised patient to continue the current instructions or medications. Plan:      Labs ordered, reviewed. Medications refilled. All Health maintenance needs reviewed and the needful ordered.            Laura Jang MD

## 2021-06-16 NOTE — TELEPHONE ENCOUNTER
Medication:   Requested Prescriptions     Pending Prescriptions Disp Refills    sildenafil (REVATIO) 20 MG tablet [Pharmacy Med Name: SILDENAFIL 20 MG TABLET] 30 tablet 0     Sig: TAKE TWO TABLETS BY MOUTH DAILY AS NEEDED     Last Filled:  05/28/21    Last appt: 1/4/2021   Next appt: 6/16/2021    Last OARRS: No flowsheet data found.

## 2021-06-16 NOTE — ASSESSMENT & PLAN NOTE
Diabetes Mellitus Type II:  Home blood sugar records reviewed: fasting range: 120-130. No significant episodes of hypoglycemia. Compliant with medications. No polyuria, polydipsia, visual changes, foot problems, GI upset. Diabetic diet compliance:  compliant most of the time. Current exercise: none. Will check labs, and refill medications as appropriate. Hypertension:  Denies CP, SOB, visual changes, dizziness, palpitations or HA. He is adherent to a low sodium diet. Blood pressure typically runs ok  outside of the office. Continue current medications. Hyperlipidemia:  No new myalgias or GI upset on current medications. Lab Results   Component Value Date    LABA1C 6.2 01/04/2021    LABA1C 6.5 07/07/2020    LABA1C 6.5 07/02/2020     Lab Results   Component Value Date    LABMICR <1.20 01/04/2021    CREATININE 1.0 07/07/2020     Lab Results   Component Value Date    ALT 23 07/07/2020    AST 23 07/07/2020     No components found for: CHLPL  Lab Results   Component Value Date    TRIG 170 (H) 07/07/2020     Lab Results   Component Value Date    HDL 42 07/07/2020     Lab Results   Component Value Date    LDLCALC 100 07/07/2020    LDLDIRECT 120 03/22/2016         BP Readings from Last 2 Encounters:   06/16/21 114/80   01/04/21 138/88     Hemoglobin A1C (%)   Date Value   01/04/2021 6.2     Microscopic Examination (no units)   Date Value   09/19/2016 Not Indicated     Microalbumin, Random Urine (mg/dL)   Date Value   01/04/2021 <1.20     LDL Calculated (mg/dL)   Date Value   07/07/2020 100 (H)              Tobacco use:  Patient  reports that he has never smoked. He has never used smokeless tobacco.    If Smoker - Cessation materials given? NA    Is Daily aspirin on medication list?:  Yes    Diabetic retinal exam done? Yes   If yes, document in Health Maintenance. Monofilament placed on counter? Yes    Shoes and socks removed? Yes    BP taken with correct size cuff?  Yes   Repeated if > 130/80 Yes

## 2021-07-01 RX ORDER — SILDENAFIL CITRATE 20 MG/1
TABLET ORAL
Qty: 30 TABLET | Refills: 0 | Status: SHIPPED | OUTPATIENT
Start: 2021-07-01 | End: 2021-07-19

## 2021-07-01 NOTE — TELEPHONE ENCOUNTER
Medication:   Requested Prescriptions     Pending Prescriptions Disp Refills    sildenafil (REVATIO) 20 MG tablet [Pharmacy Med Name: SILDENAFIL 20 MG TABLET] 30 tablet 0     Sig: TAKE TWO TABLETS BY MOUTH DAILY AS NEEDED     Last Filled:  06/16/21    Last appt: 6/16/2021   Next appt: 12/16/2021    Last OARRS: No flowsheet data found.

## 2021-07-06 RX ORDER — MONTELUKAST SODIUM 10 MG/1
TABLET ORAL
Qty: 90 TABLET | Refills: 0 | Status: SHIPPED | OUTPATIENT
Start: 2021-07-06 | End: 2021-10-05

## 2021-07-06 NOTE — TELEPHONE ENCOUNTER
Medication:   Requested Prescriptions     Pending Prescriptions Disp Refills    montelukast (SINGULAIR) 10 MG tablet [Pharmacy Med Name: MONTELUKAST SOD 10 MG TABLET] 90 tablet 0     Sig: TAKE ONE TABLET BY MOUTH DAILY     Last Filled:  01/04/21    Last appt: 6/16/2021   Next appt: 12/16/2021    Last OARRS: No flowsheet data found.

## 2021-07-19 RX ORDER — SILDENAFIL CITRATE 20 MG/1
TABLET ORAL
Qty: 30 TABLET | Refills: 0 | Status: SHIPPED | OUTPATIENT
Start: 2021-07-19 | End: 2021-08-06

## 2021-07-19 NOTE — TELEPHONE ENCOUNTER
Medication:   Requested Prescriptions     Pending Prescriptions Disp Refills    sildenafil (REVATIO) 20 MG tablet [Pharmacy Med Name: SILDENAFIL 20 MG TABLET] 30 tablet 0     Sig: TAKE TWO TABLETS BY MOUTH DAILY AS NEEDED     Last Filled:  07/01/21    Last appt: 6/16/2021   Next appt: 12/16/2021    Last OARRS: No flowsheet data found.

## 2021-08-06 RX ORDER — SILDENAFIL CITRATE 20 MG/1
TABLET ORAL
Qty: 30 TABLET | Refills: 0 | Status: SHIPPED | OUTPATIENT
Start: 2021-08-06 | End: 2021-08-24

## 2021-08-06 NOTE — TELEPHONE ENCOUNTER
Medication:   Requested Prescriptions     Pending Prescriptions Disp Refills    sildenafil (REVATIO) 20 MG tablet [Pharmacy Med Name: SILDENAFIL 20 MG TABLET] 30 tablet 0     Sig: TAKE TWO TABLETS BY MOUTH DAILY AS NEEDED     Last Filled:  07/19/21    Last appt: 6/16/2021   Next appt: 12/16/2021    Last OARRS: No flowsheet data found.

## 2021-08-24 RX ORDER — SILDENAFIL CITRATE 20 MG/1
TABLET ORAL
Qty: 30 TABLET | Refills: 0 | Status: SHIPPED | OUTPATIENT
Start: 2021-08-24 | End: 2021-09-13

## 2021-08-24 NOTE — TELEPHONE ENCOUNTER
Medication:   Requested Prescriptions     Pending Prescriptions Disp Refills    sildenafil (REVATIO) 20 MG tablet [Pharmacy Med Name: SILDENAFIL 20 MG TABLET] 30 tablet 0     Sig: TAKE TWO TABLETS BY MOUTH DAILY AS NEEDED       Last appt: 6/16/2021   Next appt: 12/16/2021    Last OARRS: No flowsheet data found.

## 2021-09-13 RX ORDER — SILDENAFIL CITRATE 20 MG/1
TABLET ORAL
Qty: 30 TABLET | Refills: 0 | Status: SHIPPED | OUTPATIENT
Start: 2021-09-13 | End: 2021-11-04

## 2021-09-13 NOTE — TELEPHONE ENCOUNTER
Medication:   Requested Prescriptions     Pending Prescriptions Disp Refills    sildenafil (REVATIO) 20 MG tablet [Pharmacy Med Name: SILDENAFIL 20 MG TABLET] 30 tablet 0     Sig: TAKE TWO TABLETS BY MOUTH DAILY AS NEEDED     Last Filled:  08/24/21    Last appt: 6/16/2021   Next appt: 12/16/2021    Last OARRS: No flowsheet data found.

## 2021-09-21 ENCOUNTER — TELEPHONE (OUTPATIENT)
Dept: PRIMARY CARE CLINIC | Age: 61
End: 2021-09-21

## 2021-09-21 NOTE — TELEPHONE ENCOUNTER
Calling about Sildenafil. The PA is denied.  They have contacted the pt about the denial and they sent our office information about the denial.

## 2021-09-30 RX ORDER — AMLODIPINE BESYLATE 10 MG/1
TABLET ORAL
Qty: 90 TABLET | Refills: 1 | Status: SHIPPED | OUTPATIENT
Start: 2021-09-30 | End: 2022-04-01

## 2021-09-30 NOTE — TELEPHONE ENCOUNTER
Medication:   Requested Prescriptions     Pending Prescriptions Disp Refills    amLODIPine (NORVASC) 10 MG tablet [Pharmacy Med Name: amLODIPine BESYLATE 10 MG TAB] 90 tablet 1     Sig: TAKE ONE TABLET BY MOUTH DAILY     Last Filled:  01/04/21    Last appt: 6/16/2021   Next appt: 12/16/2021    Last OARRS: No flowsheet data found.

## 2021-10-05 RX ORDER — MONTELUKAST SODIUM 10 MG/1
TABLET ORAL
Qty: 90 TABLET | Refills: 0 | Status: SHIPPED | OUTPATIENT
Start: 2021-10-05 | End: 2022-01-03

## 2021-10-05 NOTE — TELEPHONE ENCOUNTER
Medication:   Requested Prescriptions     Pending Prescriptions Disp Refills    montelukast (SINGULAIR) 10 MG tablet [Pharmacy Med Name: MONTELUKAST SOD 10 MG TABLET] 90 tablet 0     Sig: TAKE ONE TABLET BY MOUTH DAILY     Last Filled:  07/06/21    Last appt: 6/16/2021   Next appt: 12/16/2021    Last OARRS: No flowsheet data found.

## 2021-11-04 RX ORDER — SILDENAFIL CITRATE 20 MG/1
TABLET ORAL
Qty: 30 TABLET | Refills: 0 | Status: SHIPPED | OUTPATIENT
Start: 2021-11-04 | End: 2021-11-22

## 2021-11-08 RX ORDER — LISINOPRIL AND HYDROCHLOROTHIAZIDE 25; 20 MG/1; MG/1
TABLET ORAL
Qty: 90 TABLET | Refills: 1 | Status: SHIPPED | OUTPATIENT
Start: 2021-11-08 | End: 2022-05-06

## 2021-11-08 NOTE — TELEPHONE ENCOUNTER
Medication:   Requested Prescriptions     Pending Prescriptions Disp Refills    lisinopril-hydroCHLOROthiazide (PRINZIDE;ZESTORETIC) 20-25 MG per tablet [Pharmacy Med Name: LISINOPRIL-HCTZ 20-25 MG TAB] 90 tablet 1     Sig: TAKE ONE TABLET BY MOUTH DAILY     Last Filled:  01/04/2021    Last appt: 6/16/2021   Next appt: 12/16/2021    Last OARRS: No flowsheet data found.

## 2021-11-15 ENCOUNTER — CLINICAL DOCUMENTATION (OUTPATIENT)
Dept: OTHER | Age: 61
End: 2021-11-15

## 2021-11-22 RX ORDER — SILDENAFIL CITRATE 20 MG/1
TABLET ORAL
Qty: 30 TABLET | Refills: 0 | Status: SHIPPED | OUTPATIENT
Start: 2021-11-22 | End: 2021-12-06

## 2021-11-29 NOTE — TELEPHONE ENCOUNTER
Medication:   Requested Prescriptions     Pending Prescriptions Disp Refills    metFORMIN (GLUCOPHAGE) 500 MG tablet [Pharmacy Med Name: metFORMIN  MG TABLET] 180 tablet 1     Sig: TAKE ONE TABLET BY MOUTH TWICE A DAY WITH MEALS     Last Filled:  01/04/2021    Last appt: 6/16/2021   Next appt: 12/16/2021    Last OARRS: No flowsheet data found.

## 2021-12-06 RX ORDER — SILDENAFIL CITRATE 20 MG/1
TABLET ORAL
Qty: 30 TABLET | Refills: 0 | Status: SHIPPED | OUTPATIENT
Start: 2021-12-06 | End: 2022-06-16 | Stop reason: SDUPTHER

## 2021-12-06 RX ORDER — ATORVASTATIN CALCIUM 80 MG/1
TABLET, FILM COATED ORAL
Qty: 90 TABLET | Refills: 1 | Status: SHIPPED | OUTPATIENT
Start: 2021-12-06 | End: 2022-06-06

## 2021-12-16 ENCOUNTER — OFFICE VISIT (OUTPATIENT)
Dept: PRIMARY CARE CLINIC | Age: 61
End: 2021-12-16
Payer: COMMERCIAL

## 2021-12-16 VITALS
OXYGEN SATURATION: 99 % | HEIGHT: 72 IN | BODY MASS INDEX: 28.85 KG/M2 | HEART RATE: 57 BPM | DIASTOLIC BLOOD PRESSURE: 80 MMHG | WEIGHT: 213 LBS | SYSTOLIC BLOOD PRESSURE: 120 MMHG | RESPIRATION RATE: 14 BRPM | TEMPERATURE: 97.2 F

## 2021-12-16 DIAGNOSIS — E78.2 MIXED HYPERLIPIDEMIA: ICD-10-CM

## 2021-12-16 DIAGNOSIS — E11.9 TYPE 2 DIABETES MELLITUS WITHOUT COMPLICATION, WITHOUT LONG-TERM CURRENT USE OF INSULIN (HCC): ICD-10-CM

## 2021-12-16 DIAGNOSIS — I10 ESSENTIAL HYPERTENSION: ICD-10-CM

## 2021-12-16 DIAGNOSIS — Z00.00 PHYSICAL EXAM, ANNUAL: Primary | ICD-10-CM

## 2021-12-16 LAB — HBA1C MFR BLD: 6.4 %

## 2021-12-16 PROCEDURE — G8484 FLU IMMUNIZE NO ADMIN: HCPCS | Performed by: INTERNAL MEDICINE

## 2021-12-16 PROCEDURE — 99396 PREV VISIT EST AGE 40-64: CPT | Performed by: INTERNAL MEDICINE

## 2021-12-16 PROCEDURE — 83036 HEMOGLOBIN GLYCOSYLATED A1C: CPT | Performed by: INTERNAL MEDICINE

## 2021-12-16 RX ORDER — DICLOFENAC SODIUM 75 MG/1
TABLET, DELAYED RELEASE ORAL
COMMUNITY
Start: 2021-12-14

## 2021-12-16 NOTE — PROGRESS NOTES
Subjective:      Patient ID: Bisi Phillips is a 57 y.o. male. HPI  Established patient here for a visit to manage acute and chronic medical conditions as detailed below. Type 2 diabetes mellitus without complication, without long-term current use of insulin (HCC)  Diabetes Mellitus Type II:  Home blood sugar records reviewed: fasting range: 120-130. No significant episodes of hypoglycemia. Compliant with medications. No polyuria, polydipsia, visual changes, foot problems, GI upset. Diabetic diet compliance:  compliant most of the time. Current exercise: none. Will check labs, and refill medications as appropriate. Hypertension:  Denies CP, SOB, visual changes, dizziness, palpitations or HA. He is adherent to a low sodium diet. Blood pressure typically runs ok outside of the office. Continue current medications. Hyperlipidemia:  No new myalgias or GI upset on current medications. Lab Results   Component Value Date    LABA1C 6.3 06/16/2021    LABA1C 6.2 01/04/2021    LABA1C 6.5 07/07/2020     Lab Results   Component Value Date    LABMICR <1.20 06/16/2021    CREATININE 0.9 06/16/2021     Lab Results   Component Value Date    ALT 21 06/16/2021    AST 20 06/16/2021     No components found for: CHLPL  Lab Results   Component Value Date    TRIG 253 (H) 06/16/2021     Lab Results   Component Value Date    HDL 34 (L) 06/16/2021     Lab Results   Component Value Date    LDLCALC 79 06/16/2021    LDLDIRECT 120 03/22/2016        BP Readings from Last 2 Encounters:   12/16/21 120/80   06/16/21 114/80     Hemoglobin A1C (%)   Date Value   06/16/2021 6.3     Microscopic Examination (no units)   Date Value   09/19/2016 Not Indicated     Microalbumin, Random Urine (mg/dL)   Date Value   06/16/2021 <1.20     LDL Calculated (mg/dL)   Date Value   06/16/2021 79              Tobacco use:  Patient  reports that he has never smoked. He has never used smokeless tobacco.    If Smoker - Cessation materials given? NA    Is Daily aspirin on medication list?:  Yes    Diabetic retinal exam done? Yes   If yes, document in Health Maintenance. Monofilament placed on counter? Yes    Shoes and socks removed? Yes    BP taken with correct size cuff? Yes   Repeated if > 130/80 Yes     Microalbumin performed if applicable? Yes        Essential hypertension  This is a chronic problem. The problem is well controlled. Patient monitors readings regularly. Pertinent negatives include no chest pain, focal sensory loss, focal weakness, leg pain, myalgias or shortness of breath. No headaches or chest pain. Takes medications regularly. Blood pressure has been stable, blood work was reviewed, and advised patient to continue the current instructions or medications. Mixed hyperlipidemia  This has been a long standing problem, takes lipitor     Monitors diet and tries to follow a low fat diet. Has  been reasonably  compliant w exercise. Lipids have been stable, The problem is controlled. Recent lipid tests were reviewed and are normal. Pertinent negatives include no chest pain, focal sensory loss, focal weakness, leg pain, myalgias or shortness of breath. Advised patient to continue the current instructions or medications. Review of Systems  ROS: No unusual headaches or allergy symptoms or blurred vision. No prolonged cough. No flushing or facial pain or chest pain,dizziness, dyspnea, palpitations, or chest pain on exertion. No syncope. No nausea or vommitting or diarrhea. No jaundice or abdominal pain, change in bowel habits, black or bloody stools. No dysuria or hematuria or frequency of urination. No myalgias or muscle pain. No numbness, weakness, or tingling. No falls, or loss of consciousness. No weight loss or back pain. No falls. No paresthesias. No joint swelling or redness. No joint pain. No recent weight loss. No focal weakness or sensory deficits or paresthesias, No confusion or altered sensorium. No hematemesis. No hearing loss. No siezures. All other systems were reviewed, and review was negative. Objective:   Physical Exam  /80 (Site: Left Upper Arm, Position: Sitting, Cuff Size: Medium Adult)   Pulse 57   Temp 97.2 °F (36.2 °C)   Resp 14   Ht 6' (1.829 m)   Wt 213 lb (96.6 kg)   SpO2 99%   BMI 28.89 kg/m²    The physical exam reveals a patient who appears well, alert and oriented x 3, pleasant, cooperative. Vitals are as noted. Head is atraumatic and normocephalic. Eyes reveal normal conjunctiva, cornea normal, pupils are equal and rective to light. Nasal mucosa is normal. Throat is normal without exudates. Ears reveal normal tympanic membranes. Neck is supple and free of adenopathy, or masses. No thyromegaly. No jugular venous distension. Lungs are clear to auscultation, no rales or rhonchi noted. Heart sounds are regular , no murmurs, clicks, gallops or rubs. Abdomen is soft, no tenderness, masses or organomegaly. Bowel sounds are normally heard. Pelvis: normal. Extremities are normal. Peripheral pulses are normal. Screening neurological exam is normal without focal findings. Cranial nerves are intact, reflexes are symmetrical and muscle strength eaqual. Skin is normal without suspicious lesions noted. Assessment:      Type 2 diabetes mellitus without complication, without long-term current use of insulin (HCC)  Diabetes Mellitus Type II:  Home blood sugar records reviewed: fasting range: 120-130. No significant episodes of hypoglycemia. Compliant with medications. No polyuria, polydipsia, visual changes, foot problems, GI upset. Diabetic diet compliance:  compliant most of the time. Current exercise: none. Will check labs, and refill medications as appropriate. Hypertension:  Denies CP, SOB, visual changes, dizziness, palpitations or HA. He is adherent to a low sodium diet. Blood pressure typically runs ok outside of the office. Continue current medications.     Hyperlipidemia:  No new myalgias or GI upset on current medications. Lab Results   Component Value Date    LABA1C 6.3 06/16/2021    LABA1C 6.2 01/04/2021    LABA1C 6.5 07/07/2020     Lab Results   Component Value Date    LABMICR <1.20 06/16/2021    CREATININE 0.9 06/16/2021     Lab Results   Component Value Date    ALT 21 06/16/2021    AST 20 06/16/2021     No components found for: CHLPL  Lab Results   Component Value Date    TRIG 253 (H) 06/16/2021     Lab Results   Component Value Date    HDL 34 (L) 06/16/2021     Lab Results   Component Value Date    LDLCALC 79 06/16/2021    LDLDIRECT 120 03/22/2016        BP Readings from Last 2 Encounters:   12/16/21 120/80   06/16/21 114/80     Hemoglobin A1C (%)   Date Value   06/16/2021 6.3     Microscopic Examination (no units)   Date Value   09/19/2016 Not Indicated     Microalbumin, Random Urine (mg/dL)   Date Value   06/16/2021 <1.20     LDL Calculated (mg/dL)   Date Value   06/16/2021 79              Tobacco use:  Patient  reports that he has never smoked. He has never used smokeless tobacco.    If Smoker - Cessation materials given? NA    Is Daily aspirin on medication list?:  Yes    Diabetic retinal exam done? Yes   If yes, document in Health Maintenance. Monofilament placed on counter? Yes    Shoes and socks removed? Yes    BP taken with correct size cuff? Yes   Repeated if > 130/80 Yes     Microalbumin performed if applicable? Yes        Essential hypertension  This is a chronic problem. The problem is well controlled. Patient monitors readings regularly. Pertinent negatives include no chest pain, focal sensory loss, focal weakness, leg pain, myalgias or shortness of breath. No headaches or chest pain. Takes medications regularly. Blood pressure has been stable, blood work was reviewed, and advised patient to continue the current instructions or medications.        Mixed hyperlipidemia  This has been a long standing problem, takes lipitor     Monitors diet and tries to follow a low fat diet. Has  been reasonably  compliant w exercise. Lipids have been stable, The problem is controlled. Recent lipid tests were reviewed and are normal. Pertinent negatives include no chest pain, focal sensory loss, focal weakness, leg pain, myalgias or shortness of breath. Advised patient to continue the current instructions or medications. Plan:      Labs ordered, reviewed. Medications refilled. All Health maintenance needs reviewed and the needful ordered.            Leigha Shay MD

## 2021-12-16 NOTE — ASSESSMENT & PLAN NOTE
Diabetes Mellitus Type II:  Home blood sugar records reviewed: fasting range: 120-130. No significant episodes of hypoglycemia. Compliant with medications. No polyuria, polydipsia, visual changes, foot problems, GI upset. Diabetic diet compliance:  compliant most of the time. Current exercise: none. Will check labs, and refill medications as appropriate. Hypertension:  Denies CP, SOB, visual changes, dizziness, palpitations or HA. He is adherent to a low sodium diet. Blood pressure typically runs ok outside of the office. Continue current medications. Hyperlipidemia:  No new myalgias or GI upset on current medications. Lab Results   Component Value Date    LABA1C 6.3 06/16/2021    LABA1C 6.2 01/04/2021    LABA1C 6.5 07/07/2020     Lab Results   Component Value Date    LABMICR <1.20 06/16/2021    CREATININE 0.9 06/16/2021     Lab Results   Component Value Date    ALT 21 06/16/2021    AST 20 06/16/2021     No components found for: CHLPL  Lab Results   Component Value Date    TRIG 253 (H) 06/16/2021     Lab Results   Component Value Date    HDL 34 (L) 06/16/2021     Lab Results   Component Value Date    LDLCALC 79 06/16/2021    LDLDIRECT 120 03/22/2016        BP Readings from Last 2 Encounters:   12/16/21 120/80   06/16/21 114/80     Hemoglobin A1C (%)   Date Value   06/16/2021 6.3     Microscopic Examination (no units)   Date Value   09/19/2016 Not Indicated     Microalbumin, Random Urine (mg/dL)   Date Value   06/16/2021 <1.20     LDL Calculated (mg/dL)   Date Value   06/16/2021 79              Tobacco use:  Patient  reports that he has never smoked. He has never used smokeless tobacco.    If Smoker - Cessation materials given? NA    Is Daily aspirin on medication list?:  Yes    Diabetic retinal exam done? Yes   If yes, document in Health Maintenance. Monofilament placed on counter? Yes    Shoes and socks removed? Yes    BP taken with correct size cuff?  Yes   Repeated if > 130/80 Yes Microalbumin performed if applicable?   Yes

## 2021-12-16 NOTE — ASSESSMENT & PLAN NOTE
This has been a long standing problem, takes lipitor     Monitors diet and tries to follow a low fat diet. Has  been reasonably  compliant w exercise. Lipids have been stable, The problem is controlled. Recent lipid tests were reviewed and are normal. Pertinent negatives include no chest pain, focal sensory loss, focal weakness, leg pain, myalgias or shortness of breath. Advised patient to continue the current instructions or medications.

## 2021-12-22 ENCOUNTER — OFFICE VISIT (OUTPATIENT)
Dept: PRIMARY CARE CLINIC | Age: 61
End: 2021-12-22
Payer: COMMERCIAL

## 2021-12-22 VITALS
RESPIRATION RATE: 14 BRPM | DIASTOLIC BLOOD PRESSURE: 80 MMHG | TEMPERATURE: 97.6 F | SYSTOLIC BLOOD PRESSURE: 120 MMHG | OXYGEN SATURATION: 99 % | HEART RATE: 54 BPM | WEIGHT: 217 LBS | BODY MASS INDEX: 29.43 KG/M2

## 2021-12-22 DIAGNOSIS — H65.111 ACUTE MUCOID OTITIS MEDIA OF RIGHT EAR: Primary | ICD-10-CM

## 2021-12-22 DIAGNOSIS — J01.00 ACUTE MAXILLARY SINUSITIS, RECURRENCE NOT SPECIFIED: ICD-10-CM

## 2021-12-22 PROCEDURE — G8427 DOCREV CUR MEDS BY ELIG CLIN: HCPCS | Performed by: INTERNAL MEDICINE

## 2021-12-22 PROCEDURE — 99213 OFFICE O/P EST LOW 20 MIN: CPT | Performed by: INTERNAL MEDICINE

## 2021-12-22 PROCEDURE — G8417 CALC BMI ABV UP PARAM F/U: HCPCS | Performed by: INTERNAL MEDICINE

## 2021-12-22 PROCEDURE — 3017F COLORECTAL CA SCREEN DOC REV: CPT | Performed by: INTERNAL MEDICINE

## 2021-12-22 PROCEDURE — G8484 FLU IMMUNIZE NO ADMIN: HCPCS | Performed by: INTERNAL MEDICINE

## 2021-12-22 PROCEDURE — 1036F TOBACCO NON-USER: CPT | Performed by: INTERNAL MEDICINE

## 2021-12-22 RX ORDER — AZITHROMYCIN 250 MG/1
250 TABLET, FILM COATED ORAL SEE ADMIN INSTRUCTIONS
Qty: 6 TABLET | Refills: 0 | Status: SHIPPED | OUTPATIENT
Start: 2021-12-22 | End: 2021-12-27 | Stop reason: SDUPTHER

## 2021-12-22 RX ORDER — GUAIFENESIN AND PSEUDOEPHEDRINE HCL 1200; 120 MG/1; MG/1
TABLET, EXTENDED RELEASE ORAL
Qty: 20 TABLET | Refills: 0 | Status: SHIPPED | OUTPATIENT
Start: 2021-12-22

## 2021-12-22 NOTE — PROGRESS NOTES
Chief complaint-  Ear pain and discomfort for 7  Days. [place]-  Patient c/o rt     Ear pain and discomfort for several days. Also has some sinus symptoms Has a muffled sensation and a clogged feeling. Has some pain in the part of the neck adjoining the ear. Has some pain on swallowing. Has been gradual in onset and is getting worse. Some hearing deficit. No dizziness or tinnitus. No aural discharge. Some sore throat. Only a mild amount of throat congestion. No cough or shortness of breath. No fever or chills. Feels like she has some swollen lymph nodes in the neck. Tried some otc meds without any help. Physical exam--  /80 (Site: Left Upper Arm, Position: Sitting, Cuff Size: Medium Adult)   Pulse 54   Temp 97.6 °F (36.4 °C)   Resp 14   Wt 217 lb (98.4 kg)   SpO2 99%   BMI 29.43 kg/m²    Throat exam normal, no erythema or exudates or postnasal drainage. Nasal mucosa is normal. Eyes look normal, conjunctiva and cornea normal.  Ear- some erythema of the Tympanic membrane on the right side     And some bulging. Neck- some tenderness in the lymph node area. Lungs are clear. Assessment-  Otitis media  Plan-  Will try some antibiotics and Over-the-counter decongestants. Suggest increase consumption of fluids. May need some ear drops for extra coverage. Call us if no better in 1 week. May need further evaluation.

## 2021-12-27 DIAGNOSIS — J01.00 ACUTE MAXILLARY SINUSITIS, RECURRENCE NOT SPECIFIED: ICD-10-CM

## 2021-12-27 DIAGNOSIS — H65.111 ACUTE MUCOID OTITIS MEDIA OF RIGHT EAR: ICD-10-CM

## 2021-12-27 RX ORDER — AZITHROMYCIN 250 MG/1
250 TABLET, FILM COATED ORAL SEE ADMIN INSTRUCTIONS
Qty: 6 TABLET | Refills: 0 | Status: SHIPPED | OUTPATIENT
Start: 2021-12-27 | End: 2022-01-01

## 2021-12-27 NOTE — TELEPHONE ENCOUNTER
----- Message from Larry Snell sent at 12/27/2021  9:24 AM EST -----  Subject: Message to Provider    QUESTIONS  Information for Provider? Pt stated that he was seen 12/22 and he was   prescribed an Zpac and was told that he would need another zpac in 5 days. Today is day six and Pt is requesting another Zpac  ---------------------------------------------------------------------------  --------------  CALL BACK INFO  What is the best way for the office to contact you? Do not leave any   message, patient will call back for answer  Preferred Call Back Phone Number? [phone]  ---------------------------------------------------------------------------  --------------  SCRIPT ANSWERS  Relationship to Patient?  Self

## 2022-01-03 RX ORDER — MONTELUKAST SODIUM 10 MG/1
TABLET ORAL
Qty: 90 TABLET | Refills: 0 | Status: SHIPPED | OUTPATIENT
Start: 2022-01-03 | End: 2022-05-03

## 2022-01-03 NOTE — TELEPHONE ENCOUNTER
Medication:   Requested Prescriptions     Pending Prescriptions Disp Refills    montelukast (SINGULAIR) 10 MG tablet [Pharmacy Med Name: MONTELUKAST SOD 10 MG TABLET] 90 tablet 0     Sig: TAKE ONE TABLET BY MOUTH DAILY     Last Filled:  10/05/2021    Last appt: 12/22/2021   Next appt: 6/16/2022    Last OARRS: No flowsheet data found.

## 2022-04-01 RX ORDER — AMLODIPINE BESYLATE 10 MG/1
TABLET ORAL
Qty: 90 TABLET | Refills: 1 | Status: SHIPPED | OUTPATIENT
Start: 2022-04-01 | End: 2022-09-22 | Stop reason: SDUPTHER

## 2022-04-01 NOTE — TELEPHONE ENCOUNTER
Medication:   Requested Prescriptions     Pending Prescriptions Disp Refills    amLODIPine (NORVASC) 10 MG tablet [Pharmacy Med Name: amLODIPine BESYLATE 10 MG TAB] 90 tablet 1     Sig: TAKE ONE TABLET BY MOUTH DAILY     Last Filled:  09/30/2021    Last appt: 12/22/2021   Next appt: 6/16/2022    Last OARRS: No flowsheet data found.

## 2022-05-03 RX ORDER — MONTELUKAST SODIUM 10 MG/1
TABLET ORAL
Qty: 30 TABLET | Refills: 3 | Status: SHIPPED | OUTPATIENT
Start: 2022-05-03 | End: 2022-08-30 | Stop reason: SDUPTHER

## 2022-05-03 NOTE — TELEPHONE ENCOUNTER
Medication:   Requested Prescriptions     Pending Prescriptions Disp Refills    montelukast (SINGULAIR) 10 MG tablet [Pharmacy Med Name: MONTELUKAST SOD 10 MG TABLET] 30 tablet      Sig: TAKE ONE TABLET BY MOUTH DAILY     Last Filled:  01/03/2022    Last appt: 12/22/2021   Next appt: 6/16/2022    Last OARRS: No flowsheet data found.

## 2022-05-06 RX ORDER — LISINOPRIL AND HYDROCHLOROTHIAZIDE 25; 20 MG/1; MG/1
TABLET ORAL
Qty: 90 TABLET | Refills: 1 | Status: SHIPPED | OUTPATIENT
Start: 2022-05-06

## 2022-05-06 NOTE — TELEPHONE ENCOUNTER
Medication:   Requested Prescriptions     Pending Prescriptions Disp Refills    lisinopril-hydroCHLOROthiazide (PRINZIDE;ZESTORETIC) 20-25 MG per tablet [Pharmacy Med Name: LISINOPRIL-HCTZ 20-25 MG TAB] 90 tablet 1     Sig: TAKE ONE TABLET BY MOUTH DAILY     Last Filled:  11/08/2021    Last appt: 12/22/2021   Next appt: 6/16/2022    Last OARRS: No flowsheet data found.

## 2022-06-06 RX ORDER — ATORVASTATIN CALCIUM 80 MG/1
TABLET, FILM COATED ORAL
Qty: 90 TABLET | Refills: 1 | Status: SHIPPED | OUTPATIENT
Start: 2022-06-06

## 2022-06-06 NOTE — TELEPHONE ENCOUNTER
Medication:   Requested Prescriptions     Pending Prescriptions Disp Refills    atorvastatin (LIPITOR) 80 MG tablet [Pharmacy Med Name: ATORVASTATIN 80 MG TABLET] 90 tablet 1     Sig: TAKE ONE TABLET BY MOUTH DAILY     Last Filled:  12/06/2021    Last appt: 12/22/2021   Next appt: 6/16/2022    Last OARRS: No flowsheet data found.

## 2022-06-16 ENCOUNTER — OFFICE VISIT (OUTPATIENT)
Dept: PRIMARY CARE CLINIC | Age: 62
End: 2022-06-16
Payer: COMMERCIAL

## 2022-06-16 VITALS
HEIGHT: 72 IN | RESPIRATION RATE: 12 BRPM | WEIGHT: 219.2 LBS | OXYGEN SATURATION: 96 % | BODY MASS INDEX: 29.69 KG/M2 | TEMPERATURE: 98.1 F | DIASTOLIC BLOOD PRESSURE: 78 MMHG | SYSTOLIC BLOOD PRESSURE: 122 MMHG | HEART RATE: 95 BPM

## 2022-06-16 DIAGNOSIS — N52.9 ERECTILE DYSFUNCTION, UNSPECIFIED ERECTILE DYSFUNCTION TYPE: ICD-10-CM

## 2022-06-16 DIAGNOSIS — Z12.5 SCREENING FOR PROSTATE CANCER: ICD-10-CM

## 2022-06-16 DIAGNOSIS — E11.9 TYPE 2 DIABETES MELLITUS WITHOUT COMPLICATION, WITHOUT LONG-TERM CURRENT USE OF INSULIN (HCC): Primary | ICD-10-CM

## 2022-06-16 DIAGNOSIS — E78.2 MIXED HYPERLIPIDEMIA: ICD-10-CM

## 2022-06-16 DIAGNOSIS — E66.9 OBESITY (BMI 30.0-34.9): ICD-10-CM

## 2022-06-16 DIAGNOSIS — I10 ESSENTIAL HYPERTENSION: ICD-10-CM

## 2022-06-16 DIAGNOSIS — E11.9 TYPE 2 DIABETES MELLITUS WITHOUT COMPLICATION, WITHOUT LONG-TERM CURRENT USE OF INSULIN (HCC): ICD-10-CM

## 2022-06-16 LAB
A/G RATIO: 2.3 (ref 1.1–2.2)
ALBUMIN SERPL-MCNC: 4.8 G/DL (ref 3.4–5)
ALP BLD-CCNC: 96 U/L (ref 40–129)
ALT SERPL-CCNC: 24 U/L (ref 10–40)
ANION GAP SERPL CALCULATED.3IONS-SCNC: 15 MMOL/L (ref 3–16)
AST SERPL-CCNC: 24 U/L (ref 15–37)
BASOPHILS ABSOLUTE: 0 K/UL (ref 0–0.2)
BASOPHILS RELATIVE PERCENT: 0.9 %
BILIRUB SERPL-MCNC: 0.6 MG/DL (ref 0–1)
BUN BLDV-MCNC: 19 MG/DL (ref 7–20)
CALCIUM SERPL-MCNC: 9.7 MG/DL (ref 8.3–10.6)
CHLORIDE BLD-SCNC: 101 MMOL/L (ref 99–110)
CHOLESTEROL, TOTAL: 156 MG/DL (ref 0–199)
CO2: 21 MMOL/L (ref 21–32)
CREAT SERPL-MCNC: 1.1 MG/DL (ref 0.8–1.3)
CREATININE URINE: 236.6 MG/DL (ref 39–259)
EOSINOPHILS ABSOLUTE: 0.2 K/UL (ref 0–0.6)
EOSINOPHILS RELATIVE PERCENT: 4.3 %
GFR AFRICAN AMERICAN: >60
GFR NON-AFRICAN AMERICAN: >60
GLUCOSE BLD-MCNC: 137 MG/DL (ref 70–99)
HBA1C MFR BLD: 6.6 %
HCT VFR BLD CALC: 41.7 % (ref 40.5–52.5)
HDLC SERPL-MCNC: 36 MG/DL (ref 40–60)
HEMOGLOBIN: 14.4 G/DL (ref 13.5–17.5)
LDL CHOLESTEROL CALCULATED: 95 MG/DL
LYMPHOCYTES ABSOLUTE: 1.1 K/UL (ref 1–5.1)
LYMPHOCYTES RELATIVE PERCENT: 21.2 %
MCH RBC QN AUTO: 31.2 PG (ref 26–34)
MCHC RBC AUTO-ENTMCNC: 34.6 G/DL (ref 31–36)
MCV RBC AUTO: 90.1 FL (ref 80–100)
MICROALBUMIN UR-MCNC: 3 MG/DL
MICROALBUMIN/CREAT UR-RTO: 12.7 MG/G (ref 0–30)
MONOCYTES ABSOLUTE: 0.4 K/UL (ref 0–1.3)
MONOCYTES RELATIVE PERCENT: 8.1 %
NEUTROPHILS ABSOLUTE: 3.3 K/UL (ref 1.7–7.7)
NEUTROPHILS RELATIVE PERCENT: 65.5 %
PDW BLD-RTO: 13.1 % (ref 12.4–15.4)
PLATELET # BLD: 175 K/UL (ref 135–450)
PMV BLD AUTO: 8.5 FL (ref 5–10.5)
POTASSIUM SERPL-SCNC: 4 MMOL/L (ref 3.5–5.1)
PROSTATE SPECIFIC ANTIGEN: 1.24 NG/ML (ref 0–4)
RBC # BLD: 4.62 M/UL (ref 4.2–5.9)
SODIUM BLD-SCNC: 137 MMOL/L (ref 136–145)
TOTAL PROTEIN: 6.9 G/DL (ref 6.4–8.2)
TRIGL SERPL-MCNC: 126 MG/DL (ref 0–150)
TSH SERPL DL<=0.05 MIU/L-ACNC: 2.57 UIU/ML (ref 0.27–4.2)
VLDLC SERPL CALC-MCNC: 25 MG/DL
WBC # BLD: 5 K/UL (ref 4–11)

## 2022-06-16 PROCEDURE — G8417 CALC BMI ABV UP PARAM F/U: HCPCS | Performed by: INTERNAL MEDICINE

## 2022-06-16 PROCEDURE — 3046F HEMOGLOBIN A1C LEVEL >9.0%: CPT | Performed by: INTERNAL MEDICINE

## 2022-06-16 PROCEDURE — 99214 OFFICE O/P EST MOD 30 MIN: CPT | Performed by: INTERNAL MEDICINE

## 2022-06-16 PROCEDURE — G8427 DOCREV CUR MEDS BY ELIG CLIN: HCPCS | Performed by: INTERNAL MEDICINE

## 2022-06-16 PROCEDURE — 1036F TOBACCO NON-USER: CPT | Performed by: INTERNAL MEDICINE

## 2022-06-16 PROCEDURE — 2022F DILAT RTA XM EVC RTNOPTHY: CPT | Performed by: INTERNAL MEDICINE

## 2022-06-16 PROCEDURE — 3017F COLORECTAL CA SCREEN DOC REV: CPT | Performed by: INTERNAL MEDICINE

## 2022-06-16 PROCEDURE — 83036 HEMOGLOBIN GLYCOSYLATED A1C: CPT | Performed by: INTERNAL MEDICINE

## 2022-06-16 RX ORDER — SILDENAFIL CITRATE 20 MG/1
TABLET ORAL
Qty: 30 TABLET | Refills: 5 | Status: SHIPPED | OUTPATIENT
Start: 2022-06-16 | End: 2022-09-19

## 2022-06-16 ASSESSMENT — PATIENT HEALTH QUESTIONNAIRE - PHQ9
SUM OF ALL RESPONSES TO PHQ9 QUESTIONS 1 & 2: 0
1. LITTLE INTEREST OR PLEASURE IN DOING THINGS: 0
SUM OF ALL RESPONSES TO PHQ QUESTIONS 1-9: 0
SUM OF ALL RESPONSES TO PHQ QUESTIONS 1-9: 0
2. FEELING DOWN, DEPRESSED OR HOPELESS: 0
SUM OF ALL RESPONSES TO PHQ QUESTIONS 1-9: 0
SUM OF ALL RESPONSES TO PHQ QUESTIONS 1-9: 0

## 2022-06-16 NOTE — ASSESSMENT & PLAN NOTE
On sildenafil prn,  Patient is compliant w medications, no side effects, effective, provides adequate symptom relief. No new symptoms or problems as noted by patient. The problem is stable, no changes noted by patient. Will consider monitoring labs and refill medications as appropriate. Patient counseled and will continue current plan.

## 2022-06-16 NOTE — PROGRESS NOTES
Subjective:      Patient ID: Elza Loomis is a 57 y.o. male. HPI  Established patient here for a visit to manage acute and chronic medical conditions as detailed below. Type 2 diabetes mellitus without complication, without long-term current use of insulin ([facility])  Diabetes Mellitus Type II:  Home blood sugar records reviewed: fasting range: 120-130. No significant episodes of hypoglycemia. Compliant with medications. No polyuria, polydipsia, visual changes, foot problems, GI upset. Diabetic diet compliance:  compliant most of the time. Current exercise: none. Will check labs, and refill medications as appropriate. Hypertension:  Denies CP, SOB, visual changes, dizziness, palpitations or HA. He is adherent to a low sodium diet. Blood pressure typically runs ok  outside of the office. Continue current medications. Hyperlipidemia:  No new myalgias or GI upset on current medications. Lab Results   Component Value Date    LABA1C 6.4 12/16/2021    LABA1C 6.3 06/16/2021    LABA1C 6.2 01/04/2021     Lab Results   Component Value Date    LABMICR <1.20 06/16/2021    CREATININE 0.9 06/16/2021     Lab Results   Component Value Date    ALT 21 06/16/2021    AST 20 06/16/2021     No components found for: CHLPL  Lab Results   Component Value Date    TRIG 253 (H) 06/16/2021     Lab Results   Component Value Date    HDL 34 (L) 06/16/2021     Lab Results   Component Value Date    LDLCALC 79 06/16/2021    LDLDIRECT 120 03/22/2016      This problem was reviewed in detail. It is under control and stable. Will check blood work. Essential hypertension  This is a chronic problem. The problem is well controlled. Patient monitors readings regularly. Pertinent negatives include no chest pain, focal sensory loss, focal weakness, leg pain, myalgias or shortness of breath. No headaches or chest pain. Takes medications regularly.   Blood pressure has been stable, blood work was reviewed, and advised patient to continue the current instructions or medications. Erectile dysfunction  On sildenafil prn,  Patient is compliant w medications, no side effects, effective, provides adequate symptom relief. No new symptoms or problems as noted by patient. The problem is stable, no changes noted by patient. Will consider monitoring labs and refill medications as appropriate. Patient counseled and will continue current plan. Mixed hyperlipidemia  This has been a long standing problem, takes lipitor      Monitors diet and tries to follow a low fat diet. Has  been reasonably  compliant w exercise. Lipids have been stable, The problem is controlled. Recent lipid tests were reviewed and are normal. Pertinent negatives include no chest pain, focal sensory loss, focal weakness, leg pain, myalgias or shortness of breath. Advised patient to continue the current instructions or medications. Obesity (BMI 30.0-34. 9)  Patient counseled,   Encouraged to lose weight, watch diet and exercise consistently. Review of Systems  All the review of systems negative except above   Objective:   Physical Exam  /78 (Site: Right Upper Arm, Position: Sitting, Cuff Size: Large Adult)   Pulse 95   Temp 98.1 °F (36.7 °C) (Temporal)   Resp 12   Ht 6' (1.829 m)   Wt 219 lb 3.2 oz (99.4 kg)   SpO2 96%   BMI 29.73 kg/m²    The physical exam reveals a patient who appears well, alert and oriented x 3, pleasant, cooperative. Vitals are as noted. Head is atraumatic and normocephalic. Eyes reveal normal conjunctiva, cornea normal, pupils are equal and rective to light. Nasal mucosa is normal. Throat is normal without exudates. Ears reveal normal tympanic membranes. Neck is supple and free of adenopathy, or masses. No thyromegaly. No jugular venous distension. Lungs are clear to auscultation, no rales or rhonchi noted. Heart sounds are regular , no murmurs, clicks, gallops or rubs. Abdomen is soft, no tenderness, masses or organomegaly. Bowel sounds are normally heard. Pelvis: normal. Extremities are normal. Peripheral pulses are normal. Screening neurological exam is normal without focal findings. Cranial nerves are intact, reflexes are symmetrical and muscle strength eaqual. Skin is normal without suspicious lesions noted. Assessment:      Type 2 diabetes mellitus without complication, without long-term current use of insulin (HCC)  Diabetes Mellitus Type II:  Home blood sugar records reviewed: fasting range: 120-130. No significant episodes of hypoglycemia. Compliant with medications. No polyuria, polydipsia, visual changes, foot problems, GI upset. Diabetic diet compliance:  compliant most of the time. Current exercise: none. Will check labs, and refill medications as appropriate. Hypertension:  Denies CP, SOB, visual changes, dizziness, palpitations or HA. He is adherent to a low sodium diet. Blood pressure typically runs ok  outside of the office. Continue current medications. Hyperlipidemia:  No new myalgias or GI upset on current medications. Lab Results   Component Value Date    LABA1C 6.4 12/16/2021    LABA1C 6.3 06/16/2021    LABA1C 6.2 01/04/2021     Lab Results   Component Value Date    LABMICR <1.20 06/16/2021    CREATININE 0.9 06/16/2021     Lab Results   Component Value Date    ALT 21 06/16/2021    AST 20 06/16/2021     No components found for: CHLPL  Lab Results   Component Value Date    TRIG 253 (H) 06/16/2021     Lab Results   Component Value Date    HDL 34 (L) 06/16/2021     Lab Results   Component Value Date    LDLCALC 79 06/16/2021    LDLDIRECT 120 03/22/2016      This problem was reviewed in detail. It is under control and stable. Will check blood work. Essential hypertension  This is a chronic problem. The problem is well controlled. Patient monitors readings regularly. Pertinent negatives include no chest pain, focal sensory loss, focal weakness, leg pain, myalgias or shortness of breath.  No headaches or chest pain. Takes medications regularly. Blood pressure has been stable, blood work was reviewed, and advised patient to continue the current instructions or medications. Erectile dysfunction  On sildenafil prn,  Patient is compliant w medications, no side effects, effective, provides adequate symptom relief. No new symptoms or problems as noted by patient. The problem is stable, no changes noted by patient. Will consider monitoring labs and refill medications as appropriate. Patient counseled and will continue current plan. Mixed hyperlipidemia  This has been a long standing problem, takes lipitor      Monitors diet and tries to follow a low fat diet. Has  been reasonably  compliant w exercise. Lipids have been stable, The problem is controlled. Recent lipid tests were reviewed and are normal. Pertinent negatives include no chest pain, focal sensory loss, focal weakness, leg pain, myalgias or shortness of breath. Advised patient to continue the current instructions or medications. Obesity (BMI 30.0-34. 9)  Patient counseled,   Encouraged to lose weight, watch diet and exercise consistently. Plan:      Labs ordered, reviewed. Medications refilled. All Health maintenance needs reviewed and the needful ordered.            Ansley Campos MD

## 2022-06-16 NOTE — ASSESSMENT & PLAN NOTE
Diabetes Mellitus Type II:  Home blood sugar records reviewed: fasting range: 120-130. No significant episodes of hypoglycemia. Compliant with medications. No polyuria, polydipsia, visual changes, foot problems, GI upset. Diabetic diet compliance:  compliant most of the time. Current exercise: none. Will check labs, and refill medications as appropriate. Hypertension:  Denies CP, SOB, visual changes, dizziness, palpitations or HA. He is adherent to a low sodium diet. Blood pressure typically runs ok  outside of the office. Continue current medications. Hyperlipidemia:  No new myalgias or GI upset on current medications. Lab Results   Component Value Date    LABA1C 6.4 12/16/2021    LABA1C 6.3 06/16/2021    LABA1C 6.2 01/04/2021     Lab Results   Component Value Date    LABMICR <1.20 06/16/2021    CREATININE 0.9 06/16/2021     Lab Results   Component Value Date    ALT 21 06/16/2021    AST 20 06/16/2021     No components found for: CHLPL  Lab Results   Component Value Date    TRIG 253 (H) 06/16/2021     Lab Results   Component Value Date    HDL 34 (L) 06/16/2021     Lab Results   Component Value Date    LDLCALC 79 06/16/2021    LDLDIRECT 120 03/22/2016      This problem was reviewed in detail. It is under control and stable. Will check blood work.

## 2022-06-27 NOTE — TELEPHONE ENCOUNTER
Medication:   Requested Prescriptions     Pending Prescriptions Disp Refills    metFORMIN (GLUCOPHAGE) 500 MG tablet 180 tablet 1     Sig: TAKE ONE TABLET BY MOUTH TWICE A DAY WITH MEALS     Last Filled: 11.29.21    Last appt: 6/16/2022   Next appt: Visit date not found    Last OARRS: No flowsheet data found.

## 2022-06-27 NOTE — TELEPHONE ENCOUNTER
----- Message from David Jurado sent at 6/24/2022  5:56 PM EDT -----  Subject: Refill Request    QUESTIONS  Name of Medication? metFORMIN (GLUCOPHAGE) 500 MG tablet  Patient-reported dosage and instructions? one pill, twice a day  How many days do you have left? 4  Preferred Pharmacy? [facility] [id number]  Pharmacy phone number (if available)? [phone]  ---------------------------------------------------------------------------  --------------  [employer] INFO  What is the best way for the office to contact you? OK to leave message on   voicemail  Preferred Call Back Phone Number? [phone]  ---------------------------------------------------------------------------  --------------  SCRIPT ANSWERS  Relationship to Patient?  Self

## 2022-06-27 NOTE — TELEPHONE ENCOUNTER
----- Message from Saranya Gray sent at 6/27/2022  9:09 AM EDT -----  Subject: Refill Request    QUESTIONS  Name of Medication? metFORMIN (GLUCOPHAGE) 500 MG tablet  Patient-reported dosage and instructions? 2pills everyday  How many days do you have left? 0  Preferred Pharmacy? [facility] [id number]  Pharmacy phone number (if available)? [phone]  ---------------------------------------------------------------------------  --------------  Seamus PAZ  What is the best way for the office to contact you? OK to leave message on   voicemail  Preferred Call Back Phone Number? [phone]  ---------------------------------------------------------------------------  --------------  SCRIPT ANSWERS  Relationship to Patient?  Self

## 2022-08-30 RX ORDER — MONTELUKAST SODIUM 10 MG/1
TABLET ORAL
Qty: 30 TABLET | Refills: 5 | Status: SHIPPED | OUTPATIENT
Start: 2022-08-30

## 2022-09-19 RX ORDER — SILDENAFIL CITRATE 20 MG/1
TABLET ORAL
Qty: 30 TABLET | Refills: 5 | Status: SHIPPED | OUTPATIENT
Start: 2022-09-19

## 2022-09-19 NOTE — TELEPHONE ENCOUNTER
Medication:   Requested Prescriptions     Pending Prescriptions Disp Refills    sildenafil (REVATIO) 20 MG tablet [Pharmacy Med Name: SILDENAFIL 20 MG TABLET] 30 tablet 5     Sig: TAKE TWO TABLETS BY MOUTH DAILY AS NEEDED     Last Filled:  06/16/2022    Last appt: 6/16/2022   Next appt: Visit date not found    Last OARRS: No flowsheet data found.

## 2022-09-22 RX ORDER — AMLODIPINE BESYLATE 10 MG/1
TABLET ORAL
Qty: 90 TABLET | Refills: 1 | Status: SHIPPED | OUTPATIENT
Start: 2022-09-22

## 2022-09-22 NOTE — TELEPHONE ENCOUNTER
Medication:   Requested Prescriptions     Pending Prescriptions Disp Refills    amLODIPine (NORVASC) 10 MG tablet 90 tablet 1       Last Filled:      Patient Phone Number: [phone] (home)     Last appt: 6/16/2022   Next appt: Visit date not found    Last BMP:   Lab Results   Component Value Date/Time     06/16/2022 11:57 AM    K 4.0 06/16/2022 11:57 AM     06/16/2022 11:57 AM    CO2 21 06/16/2022 11:57 AM    ANIONGAP 15 06/16/2022 11:57 AM    GLUCOSE 137 06/16/2022 11:57 AM    BUN 19 06/16/2022 11:57 AM    CREATININE 1.1 06/16/2022 11:57 AM    LABGLOM >60 06/16/2022 11:57 AM    GFRAA >60 06/16/2022 11:57 AM    GFRAA >60 05/02/2013 10:05 AM    CALCIUM 9.7 06/16/2022 11:57 AM      Last CMP:   Lab Results   Component Value Date/Time     06/16/2022 11:57 AM    K 4.0 06/16/2022 11:57 AM     06/16/2022 11:57 AM    CO2 21 06/16/2022 11:57 AM    ANIONGAP 15 06/16/2022 11:57 AM    GLUCOSE 137 06/16/2022 11:57 AM    BUN 19 06/16/2022 11:57 AM    CREATININE 1.1 06/16/2022 11:57 AM    LABGLOM >60 06/16/2022 11:57 AM    GFRAA >60 06/16/2022 11:57 AM    GFRAA >60 05/02/2013 10:05 AM    PROT 6.9 06/16/2022 11:57 AM    PROT 7.4 11/02/2012 09:20 AM    LABALBU 4.8 06/16/2022 11:57 AM    AGRATIO 2.3 06/16/2022 11:57 AM    BILITOT 0.6 06/16/2022 11:57 AM    ALKPHOS 96 06/16/2022 11:57 AM    ALT 24 06/16/2022 11:57 AM    AST 24 06/16/2022 11:57 AM    GLOB 2.0 06/16/2021 09:32 AM     Last Renal Function:   Lab Results   Component Value Date/Time     06/16/2022 11:57 AM    K 4.0 06/16/2022 11:57 AM     06/16/2022 11:57 AM    CO2 21 06/16/2022 11:57 AM    GLUCOSE 137 06/16/2022 11:57 AM    BUN 19 06/16/2022 11:57 AM    CREATININE 1.1 06/16/2022 11:57 AM    LABALBU 4.8 06/16/2022 11:57 AM    CALCIUM 9.7 06/16/2022 11:57 AM    GFR >60 05/02/2013 10:05 AM    GFRAA >60 06/16/2022 11:57 AM    GFRAA >60 05/02/2013 10:05 AM       Last OARRS: No flowsheet data found.     Preferred Pharmacy:   [facility] [id number] [facility], [address] [place] [address]  [address]  Phone: [phone] Fax: [phone]  Medication:   Requested Prescriptions     Pending Prescriptions Disp Refills    amLODIPine (NORVASC) 10 MG tablet 90 tablet 1       Last Filled:      Patient Phone Number: [phone] (home)     Last appt: 6/16/2022   Next appt: Visit date not found    Last BMP:   Lab Results   Component Value Date/Time     06/16/2022 11:57 AM    K 4.0 06/16/2022 11:57 AM     06/16/2022 11:57 AM    CO2 21 06/16/2022 11:57 AM    ANIONGAP 15 06/16/2022 11:57 AM    GLUCOSE 137 06/16/2022 11:57 AM    BUN 19 06/16/2022 11:57 AM    CREATININE 1.1 06/16/2022 11:57 AM    LABGLOM >60 06/16/2022 11:57 AM    GFRAA >60 06/16/2022 11:57 AM    GFRAA >60 05/02/2013 10:05 AM    CALCIUM 9.7 06/16/2022 11:57 AM      Last CMP:   Lab Results   Component Value Date/Time     06/16/2022 11:57 AM    K 4.0 06/16/2022 11:57 AM     06/16/2022 11:57 AM    CO2 21 06/16/2022 11:57 AM    ANIONGAP 15 06/16/2022 11:57 AM    GLUCOSE 137 06/16/2022 11:57 AM    BUN 19 06/16/2022 11:57 AM    CREATININE 1.1 06/16/2022 11:57 AM    LABGLOM >60 06/16/2022 11:57 AM    GFRAA >60 06/16/2022 11:57 AM    GFRAA >60 05/02/2013 10:05 AM    PROT 6.9 06/16/2022 11:57 AM    PROT 7.4 11/02/2012 09:20 AM    LABALBU 4.8 06/16/2022 11:57 AM    AGRATIO 2.3 06/16/2022 11:57 AM    BILITOT 0.6 06/16/2022 11:57 AM    ALKPHOS 96 06/16/2022 11:57 AM    ALT 24 06/16/2022 11:57 AM    AST 24 06/16/2022 11:57 AM    GLOB 2.0 06/16/2021 09:32 AM     Last Renal Function:   Lab Results   Component Value Date/Time     06/16/2022 11:57 AM    K 4.0 06/16/2022 11:57 AM     06/16/2022 11:57 AM    CO2 21 06/16/2022 11:57 AM    GLUCOSE 137 06/16/2022 11:57 AM    BUN 19 06/16/2022 11:57 AM    CREATININE 1.1 06/16/2022 11:57 AM    LABALBU 4.8 06/16/2022 11:57 AM    CALCIUM 9.7 06/16/2022 11:57 AM    GFR >60 05/02/2013 10:05 AM    GFRAA >60 06/16/2022 11:57 AM    GFRAA >60 05/02/2013 10:05 AM       Last OARRS: No flowsheet data found.     Preferred Pharmacy:   [facility] [id number] Stephy Echevarria, [address]  [address] [facility] [address]  [place] [address]  Phone: [phone] Fax: [phone]

## 2022-11-14 RX ORDER — LISINOPRIL AND HYDROCHLOROTHIAZIDE 25; 20 MG/1; MG/1
TABLET ORAL
Qty: 90 TABLET | Refills: 1 | Status: SHIPPED | OUTPATIENT
Start: 2022-11-14

## 2022-11-14 NOTE — TELEPHONE ENCOUNTER
Medication:   Requested Prescriptions     Pending Prescriptions Disp Refills    lisinopril-hydroCHLOROthiazide (PRINZIDE;ZESTORETIC) 20-25 MG per tablet [Pharmacy Med Name: LISINOPRIL-HCTZ 20-25 MG TAB] 90 tablet 1     Sig: TAKE ONE TABLET BY MOUTH DAILY     Last Filled:  05/06/2022    Last appt: 6/16/2022   Next appt: Visit date not found    Last OARRS: No flowsheet data found.

## 2022-12-05 RX ORDER — ATORVASTATIN CALCIUM 80 MG/1
TABLET, FILM COATED ORAL
Qty: 90 TABLET | Refills: 1 | Status: SHIPPED | OUTPATIENT
Start: 2022-12-05

## 2022-12-05 NOTE — TELEPHONE ENCOUNTER
Medication:   Requested Prescriptions     Pending Prescriptions Disp Refills    atorvastatin (LIPITOR) 80 MG tablet [Pharmacy Med Name: ATORVASTATIN 80 MG TABLET] 90 tablet 1     Sig: TAKE ONE TABLET BY MOUTH DAILY     Last Filled:  06/06/2022    Last appt: 6/16/2022   Next appt: Visit date not found    Last OARRS: No flowsheet data found.

## 2022-12-16 ENCOUNTER — OFFICE VISIT (OUTPATIENT)
Dept: PRIMARY CARE CLINIC | Age: 62
End: 2022-12-16
Payer: COMMERCIAL

## 2022-12-16 VITALS
SYSTOLIC BLOOD PRESSURE: 130 MMHG | RESPIRATION RATE: 14 BRPM | OXYGEN SATURATION: 99 % | WEIGHT: 215 LBS | HEART RATE: 52 BPM | TEMPERATURE: 97.6 F | BODY MASS INDEX: 29.16 KG/M2 | DIASTOLIC BLOOD PRESSURE: 86 MMHG

## 2022-12-16 DIAGNOSIS — E66.9 OBESITY (BMI 30.0-34.9): ICD-10-CM

## 2022-12-16 DIAGNOSIS — I10 ESSENTIAL HYPERTENSION: ICD-10-CM

## 2022-12-16 DIAGNOSIS — E11.9 TYPE 2 DIABETES MELLITUS WITHOUT COMPLICATION, WITHOUT LONG-TERM CURRENT USE OF INSULIN (HCC): ICD-10-CM

## 2022-12-16 DIAGNOSIS — E78.2 MIXED HYPERLIPIDEMIA: ICD-10-CM

## 2022-12-16 DIAGNOSIS — Z00.00 PHYSICAL EXAM, ANNUAL: Primary | ICD-10-CM

## 2022-12-16 LAB
A/G RATIO: 2 (ref 1.1–2.2)
ALBUMIN SERPL-MCNC: 4.7 G/DL (ref 3.4–5)
ALP BLD-CCNC: 83 U/L (ref 40–129)
ALT SERPL-CCNC: 25 U/L (ref 10–40)
ANION GAP SERPL CALCULATED.3IONS-SCNC: 13 MMOL/L (ref 3–16)
AST SERPL-CCNC: 19 U/L (ref 15–37)
BASOPHILS ABSOLUTE: 0.1 K/UL (ref 0–0.2)
BASOPHILS RELATIVE PERCENT: 0.9 %
BILIRUB SERPL-MCNC: 0.9 MG/DL (ref 0–1)
BUN BLDV-MCNC: 17 MG/DL (ref 7–20)
CALCIUM SERPL-MCNC: 9.7 MG/DL (ref 8.3–10.6)
CHLORIDE BLD-SCNC: 100 MMOL/L (ref 99–110)
CHOLESTEROL, TOTAL: 174 MG/DL (ref 0–199)
CO2: 26 MMOL/L (ref 21–32)
CREAT SERPL-MCNC: 1.1 MG/DL (ref 0.8–1.3)
EOSINOPHILS ABSOLUTE: 0.2 K/UL (ref 0–0.6)
EOSINOPHILS RELATIVE PERCENT: 3.7 %
GFR SERPL CREATININE-BSD FRML MDRD: >60 ML/MIN/{1.73_M2}
GLUCOSE BLD-MCNC: 164 MG/DL (ref 70–99)
HBA1C MFR BLD: 6.7 %
HCT VFR BLD CALC: 42.6 % (ref 40.5–52.5)
HDLC SERPL-MCNC: 37 MG/DL (ref 40–60)
HEMOGLOBIN: 14.9 G/DL (ref 13.5–17.5)
LDL CHOLESTEROL CALCULATED: 93 MG/DL
LYMPHOCYTES ABSOLUTE: 1.5 K/UL (ref 1–5.1)
LYMPHOCYTES RELATIVE PERCENT: 23.7 %
MCH RBC QN AUTO: 31.7 PG (ref 26–34)
MCHC RBC AUTO-ENTMCNC: 34.9 G/DL (ref 31–36)
MCV RBC AUTO: 90.7 FL (ref 80–100)
MONOCYTES ABSOLUTE: 0.6 K/UL (ref 0–1.3)
MONOCYTES RELATIVE PERCENT: 9.3 %
NEUTROPHILS ABSOLUTE: 3.9 K/UL (ref 1.7–7.7)
NEUTROPHILS RELATIVE PERCENT: 62.4 %
PDW BLD-RTO: 13.1 % (ref 12.4–15.4)
PLATELET # BLD: 209 K/UL (ref 135–450)
PMV BLD AUTO: 8.6 FL (ref 5–10.5)
POTASSIUM SERPL-SCNC: 4.1 MMOL/L (ref 3.5–5.1)
RBC # BLD: 4.69 M/UL (ref 4.2–5.9)
SODIUM BLD-SCNC: 139 MMOL/L (ref 136–145)
TOTAL PROTEIN: 7 G/DL (ref 6.4–8.2)
TRIGL SERPL-MCNC: 222 MG/DL (ref 0–150)
TSH REFLEX: 2.3 UIU/ML (ref 0.27–4.2)
VLDLC SERPL CALC-MCNC: 44 MG/DL
WBC # BLD: 6.2 K/UL (ref 4–11)

## 2022-12-16 PROCEDURE — G8484 FLU IMMUNIZE NO ADMIN: HCPCS | Performed by: INTERNAL MEDICINE

## 2022-12-16 PROCEDURE — 99396 PREV VISIT EST AGE 40-64: CPT | Performed by: INTERNAL MEDICINE

## 2022-12-16 PROCEDURE — 83036 HEMOGLOBIN GLYCOSYLATED A1C: CPT | Performed by: INTERNAL MEDICINE

## 2022-12-16 PROCEDURE — 3078F DIAST BP <80 MM HG: CPT | Performed by: INTERNAL MEDICINE

## 2022-12-16 PROCEDURE — 3074F SYST BP LT 130 MM HG: CPT | Performed by: INTERNAL MEDICINE

## 2022-12-16 RX ORDER — ATORVASTATIN CALCIUM 80 MG/1
TABLET, FILM COATED ORAL
Qty: 90 TABLET | Refills: 1 | Status: SHIPPED | OUTPATIENT
Start: 2022-12-16

## 2022-12-16 RX ORDER — MONTELUKAST SODIUM 10 MG/1
TABLET ORAL
Qty: 90 TABLET | Refills: 1 | Status: SHIPPED | OUTPATIENT
Start: 2022-12-16

## 2022-12-16 RX ORDER — LISINOPRIL AND HYDROCHLOROTHIAZIDE 25; 20 MG/1; MG/1
TABLET ORAL
Qty: 90 TABLET | Refills: 1 | Status: SHIPPED | OUTPATIENT
Start: 2022-12-16

## 2022-12-16 RX ORDER — DICLOFENAC SODIUM 75 MG/1
75 TABLET, DELAYED RELEASE ORAL 2 TIMES DAILY
Qty: 180 TABLET | Refills: 1 | Status: SHIPPED | OUTPATIENT
Start: 2022-12-16

## 2022-12-16 RX ORDER — AMLODIPINE BESYLATE 10 MG/1
TABLET ORAL
Qty: 90 TABLET | Refills: 1 | Status: SHIPPED | OUTPATIENT
Start: 2022-12-16

## 2022-12-16 SDOH — ECONOMIC STABILITY: FOOD INSECURITY: WITHIN THE PAST 12 MONTHS, YOU WORRIED THAT YOUR FOOD WOULD RUN OUT BEFORE YOU GOT MONEY TO BUY MORE.: NEVER TRUE

## 2022-12-16 SDOH — ECONOMIC STABILITY: INCOME INSECURITY: IN THE LAST 12 MONTHS, WAS THERE A TIME WHEN YOU WERE NOT ABLE TO PAY THE MORTGAGE OR RENT ON TIME?: NO

## 2022-12-16 SDOH — ECONOMIC STABILITY: HOUSING INSECURITY
IN THE LAST 12 MONTHS, WAS THERE A TIME WHEN YOU DID NOT HAVE A STEADY PLACE TO SLEEP OR SLEPT IN A SHELTER (INCLUDING NOW)?: NO

## 2022-12-16 SDOH — HEALTH STABILITY: PHYSICAL HEALTH: ON AVERAGE, HOW MANY DAYS PER WEEK DO YOU ENGAGE IN MODERATE TO STRENUOUS EXERCISE (LIKE A BRISK WALK)?: 4 DAYS

## 2022-12-16 SDOH — ECONOMIC STABILITY: FOOD INSECURITY: WITHIN THE PAST 12 MONTHS, THE FOOD YOU BOUGHT JUST DIDN'T LAST AND YOU DIDN'T HAVE MONEY TO GET MORE.: NEVER TRUE

## 2022-12-16 SDOH — ECONOMIC STABILITY: HOUSING INSECURITY: IN THE LAST 12 MONTHS, HOW MANY PLACES HAVE YOU LIVED?: 1

## 2022-12-16 SDOH — HEALTH STABILITY: PHYSICAL HEALTH: ON AVERAGE, HOW MANY MINUTES DO YOU ENGAGE IN EXERCISE AT THIS LEVEL?: 40 MIN

## 2022-12-16 ASSESSMENT — SOCIAL DETERMINANTS OF HEALTH (SDOH)
DO YOU BELONG TO ANY CLUBS OR ORGANIZATIONS SUCH AS CHURCH GROUPS UNIONS, FRATERNAL OR ATHLETIC GROUPS, OR SCHOOL GROUPS?: NO
HOW OFTEN DO YOU GET TOGETHER WITH FRIENDS OR RELATIVES?: THREE TIMES A WEEK
HOW OFTEN DO YOU ATTEND CHURCH OR RELIGIOUS SERVICES?: NEVER
IN A TYPICAL WEEK, HOW MANY TIMES DO YOU TALK ON THE PHONE WITH FAMILY, FRIENDS, OR NEIGHBORS?: THREE TIMES A WEEK
HOW HARD IS IT FOR YOU TO PAY FOR THE VERY BASICS LIKE FOOD, HOUSING, MEDICAL CARE, AND HEATING?: NOT HARD AT ALL
HOW OFTEN DO YOU ATTENT MEETINGS OF THE CLUB OR ORGANIZATION YOU BELONG TO?: NEVER

## 2022-12-16 ASSESSMENT — LIFESTYLE VARIABLES
HOW MANY STANDARD DRINKS CONTAINING ALCOHOL DO YOU HAVE ON A TYPICAL DAY: 1 OR 2
HOW OFTEN DO YOU HAVE A DRINK CONTAINING ALCOHOL: MONTHLY OR LESS

## 2022-12-16 NOTE — PROGRESS NOTES
Chief complaints:  Jose Farris is a 60 y.o. male who presents to the office for a general physical examination. History of present illness:  Here for a physical   Type 2 diabetes mellitus without complication, without long-term current use of insulin (HCC)  Diabetes Mellitus Type II:  Home blood sugar records reviewed: fasting range: 120-130. No significant episodes of hypoglycemia. Compliant with medications. No polyuria, polydipsia, visual changes, foot problems, GI upset. Diabetic diet compliance:  compliant most of the time. Current exercise: none. Will check labs, and refill medications as appropriate. Hypertension:  Denies CP, SOB, visual changes, dizziness, palpitations or HA. He is adherent to a low sodium diet. Blood pressure typically runs ok  outside of the office. Continue current medications. Hyperlipidemia:  No new myalgias or GI upset on current medications. Lab Results   Component Value Date    LABA1C 6.6 06/16/2022    LABA1C 6.4 12/16/2021    LABA1C 6.3 06/16/2021     Lab Results   Component Value Date    LABMICR 3.00 (H) 06/16/2022    CREATININE 1.1 06/16/2022     Lab Results   Component Value Date    ALT 24 06/16/2022    AST 24 06/16/2022     No components found for: CHLPL  Lab Results   Component Value Date    TRIG 126 06/16/2022     Lab Results   Component Value Date    HDL 36 (L) 06/16/2022     Lab Results   Component Value Date/Time    LDLCALC 95 06/16/2022 11:57 AM    LDLDIRECT 120 03/22/2016 10:16 AM      This problem was reviewed in detail. It is under control and stable. Will check blood work. Essential hypertension  This is a chronic problem. The problem is well controlled. Patient monitors readings regularly. Pertinent negatives include no chest pain, focal sensory loss, focal weakness, leg pain, myalgias or shortness of breath. No headaches or chest pain. Takes medications regularly.   Blood pressure has been stable, blood work was reviewed, and Multiple Vitamins-Minerals (MULTIVITAMIN & MINERAL PO) Take by mouth      fluticasone (FLONASE) 50 MCG/ACT nasal spray 2 sprays by Nasal route daily 1 Bottle 0    Cholecalciferol (VITAMIN D) 2000 UNITS CAPS capsule Take 1 capsule by mouth daily. aspirin 81 MG chewable tablet Take 81 mg by mouth daily. pseudoephedrine-guaiFENesin 120-1200 MG TB12 1 tab po bid with a lot of fluids. 20 tablet 0    azelastine (ASTELIN) 0.1 % nasal spray 2 sprays by Nasal route 2 times daily as needed for Rhinitis Use in each nostril as directed 1 Bottle 0     No current facility-administered medications for this visit. Allergies : Patient has no known allergies. Past Surgical History:   Procedure Laterality Date    KNEE SURGERY      right    TONSILLECTOMY      VASECTOMY       Family History   Problem Relation Age of Onset    Cancer Mother         Kidney cancer    High Blood Pressure Mother     Heart Disease Mother         cad, cabg    Coronary Art Dis Mother     Other Father         Colonic polyps-- not cancer    High Blood Pressure Father     High Cholesterol Father     Diabetes Father     Cancer Father         Lung cancer, laryngeal    Cancer Maternal Grandmother         ? ? Heart Attack Maternal Grandfather     Dislocations Paternal Grandmother     Other Paternal Grandmother         CVA    Diabetes Paternal Grandmother     Other Paternal Grandfather         Surgical complications for prostate    High Blood Pressure Sister     Cancer Sister         uterine    High Blood Pressure Brother     Diabetes Brother      Social History     Tobacco Use    Smoking status: Never    Smokeless tobacco: Never   Substance Use Topics    Alcohol use:  Yes     Alcohol/week: 3.7 standard drinks     Types: 2 Cans of beer, 2 Standard drinks or equivalent per week     Comment:  2 beers per week       Review of systems :  Skin: no abnormal pigmentation, rash, scaling, itching, masses, hair or nail changes  Eyes: negative  Ears/Nose/Throat: negative  Respiratory: negative  Cardiovascular: negative  Gastrointestinal: negative  Genitourinary: negative  Musculoskeletal: negative  Neurologic: negative  Psychiatric: negative  Hematologic/Lymphatic/Immunologic: negative  Endocrine: negative       Objective :     /86 (Site: Left Upper Arm, Position: Sitting, Cuff Size: Medium Adult)   Pulse 52   Temp 97.6 °F (36.4 °C)   Resp 14   Wt 215 lb (97.5 kg)   SpO2 99%   BMI 29.16 kg/m²   General appearance - healthy, alert, no distress  Skin - Skin color, texture, turgor normal. No rashes or lesions. Head - Normocephalic. No masses, lesions, tenderness or abnormalities  Eyes - conjunctivae/corneas clear. PERRL, EOM's intact. Ears - External ears normal. Canals clear. TM's normal.  Nose/Sinuses - Nares normal. Septum midline. Mucosa normal. No drainage or sinus tenderness. Oropharynx - Lips, mucosa, and tongue normal. Teeth and gums normal. Oropharynx pink and patent  Neck - Neck supple. No adenopathy. Thyroid symmetric, normal size,  Back - Back symmetric, no curvature. ROM normal. No CVA tenderness. Lungs - Percussion normal. Good diaphragmatic excursion. Lungs clear  Heart - Regular rate and rhythm, with no rub, murmur or gallop noted. Abdomen - Abdomen soft, non-tender. BS normal. No masses, organomegaly  Extremities - Extremities normal. No deformities, edema, or skin discolora  Musculoskeletal - Spine ROM normal. Muscular strength intact. Peripheral pulses - radial=2+,, femoral=2+, popliteal=2+, dorsalis pedis=2+,  Neuro - Gait normal. Reflexes normal and symmetric. Sensation grossly normal.  No focal weakness       Assessment :     Physical exam   Type 2 diabetes mellitus without complication, without long-term current use of insulin ([facility])  Diabetes Mellitus Type II:  Home blood sugar records reviewed: fasting range: 120-130. No significant episodes of hypoglycemia. Compliant with medications.   No polyuria, polydipsia, visual changes, foot problems, GI upset. Diabetic diet compliance:  compliant most of the time. Current exercise: none. Will check labs, and refill medications as appropriate. Hypertension:  Denies CP, SOB, visual changes, dizziness, palpitations or HA. He is adherent to a low sodium diet. Blood pressure typically runs ok  outside of the office. Continue current medications. Hyperlipidemia:  No new myalgias or GI upset on current medications. Lab Results   Component Value Date    LABA1C 6.6 06/16/2022    LABA1C 6.4 12/16/2021    LABA1C 6.3 06/16/2021     Lab Results   Component Value Date    LABMICR 3.00 (H) 06/16/2022    CREATININE 1.1 06/16/2022     Lab Results   Component Value Date    ALT 24 06/16/2022    AST 24 06/16/2022     No components found for: CHLPL  Lab Results   Component Value Date    TRIG 126 06/16/2022     Lab Results   Component Value Date    HDL 36 (L) 06/16/2022     Lab Results   Component Value Date/Time    LDLCALC 95 06/16/2022 11:57 AM    LDLDIRECT 120 03/22/2016 10:16 AM      This problem was reviewed in detail. It is under control and stable. Will check blood work. Essential hypertension  This is a chronic problem. The problem is well controlled. Patient monitors readings regularly. Pertinent negatives include no chest pain, focal sensory loss, focal weakness, leg pain, myalgias or shortness of breath. No headaches or chest pain. Takes medications regularly. Blood pressure has been stable, blood work was reviewed, and advised patient to continue the current instructions or medications. Obesity (BMI 30.0-34. 9)  Patient counseled,   Encouraged to lose weight, watch diet and exercise consistently. Mixed hyperlipidemia  This has been a long standing problem, takes lipitor      Monitors diet and tries to follow a low fat diet. Has  been reasonably  compliant w exercise. Lipids have been stable, The problem is controlled.  Recent lipid tests were reviewed and are normal. Pertinent negatives include no chest pain, focal sensory loss, focal weakness, leg pain, myalgias or shortness of breath. Advised patient to continue the current instructions or medications. Plan :     Labs ordered, reviewed. Medications refilled. All Health maintenance needs reviewed and the needful ordered.        Arnold Miller MD  12/16/2022 9:29 AM

## 2023-04-01 NOTE — TELEPHONE ENCOUNTER
Medication:   Requested Prescriptions     Pending Prescriptions Disp Refills    montelukast (SINGULAIR) 10 MG tablet 30 tablet 3        Last Filled:      Patient Phone Number: [phone] (home)     Last appt: 6/16/2022   Next appt: Visit date not found    Last OARRS: No flowsheet data found. Preferred Pharmacy:   [facility] [id number] Benita Rossi, [address]   [address] [place] [address]  [address]  Phone: [phone] Fax: [phone]    Medication:   Requested Prescriptions     Pending Prescriptions Disp Refills    montelukast (SINGULAIR) 10 MG tablet 30 tablet 3        Last Filled:      Patient Phone Number: [phone] (home)     Last appt: 6/16/2022   Next appt: Visit date not found    Last OARRS: No flowsheet data found.     Preferred Pharmacy:   [facility] [id number] Benita Rossi, [address]   [address] [place] [address] [address]  [place] [address]  Phone: [phone] Fax: [phone]
153

## 2023-05-08 RX ORDER — SILDENAFIL CITRATE 20 MG/1
TABLET ORAL
Qty: 30 TABLET | Refills: 5 | Status: SHIPPED | OUTPATIENT
Start: 2023-05-08

## 2023-05-08 NOTE — TELEPHONE ENCOUNTER
Medication:   Requested Prescriptions     Pending Prescriptions Disp Refills    sildenafil (REVATIO) 20 MG tablet 30 tablet 5     Last Filled:  09/19/2022    Last appt: 12/16/2022   Next appt: Visit date not found    Last OARRS: No flowsheet data found.

## 2023-05-15 RX ORDER — SILDENAFIL CITRATE 20 MG/1
TABLET ORAL
Qty: 30 TABLET | Refills: 5 | Status: SHIPPED | OUTPATIENT
Start: 2023-05-15

## 2023-05-15 NOTE — TELEPHONE ENCOUNTER
Medication:   Requested Prescriptions     Pending Prescriptions Disp Refills    sildenafil (REVATIO) 20 MG tablet 30 tablet 5     Sig: TAKE TWO TABLETS BY MOUTH DAILY AS NEEDED  Strength: 20 mg     Last Filled:  05/08/2023    Last appt: 12/16/2022   Next appt: Called Pharmacy and pharmacy stated they did not get the prescription    Last OARRS: No flowsheet data found.

## 2023-06-19 NOTE — TELEPHONE ENCOUNTER
Medication:   Requested Prescriptions     Pending Prescriptions Disp Refills    metFORMIN (GLUCOPHAGE) 500 MG tablet [Pharmacy Med Name: metFORMIN  MG TABLET] 180 tablet 1     Sig: TAKE ONE TABLET BY MOUTH TWICE A DAY WITH MEALS     Last Filled:  12/16/2022    Last appt: 12/16/2022   Next appt: Visit date not found    Last Labs DM:   Lab Results   Component Value Date/Time    LABA1C 6.7 12/16/2022 09:33 AM

## 2023-06-25 ASSESSMENT — PATIENT HEALTH QUESTIONNAIRE - PHQ9
1. LITTLE INTEREST OR PLEASURE IN DOING THINGS: NOT AT ALL
2. FEELING DOWN, DEPRESSED OR HOPELESS: NOT AT ALL
SUM OF ALL RESPONSES TO PHQ9 QUESTIONS 1 & 2: 0
SUM OF ALL RESPONSES TO PHQ QUESTIONS 1-9: 0
1. LITTLE INTEREST OR PLEASURE IN DOING THINGS: 0
SUM OF ALL RESPONSES TO PHQ QUESTIONS 1-9: 0
SUM OF ALL RESPONSES TO PHQ QUESTIONS 1-9: 0
SUM OF ALL RESPONSES TO PHQ9 QUESTIONS 1 & 2: 0
SUM OF ALL RESPONSES TO PHQ QUESTIONS 1-9: 0
2. FEELING DOWN, DEPRESSED OR HOPELESS: 0

## 2023-06-26 ENCOUNTER — OFFICE VISIT (OUTPATIENT)
Dept: PRIMARY CARE CLINIC | Age: 63
End: 2023-06-26
Payer: COMMERCIAL

## 2023-06-26 VITALS
TEMPERATURE: 97.6 F | DIASTOLIC BLOOD PRESSURE: 70 MMHG | SYSTOLIC BLOOD PRESSURE: 120 MMHG | HEIGHT: 73 IN | OXYGEN SATURATION: 99 % | BODY MASS INDEX: 28.23 KG/M2 | HEART RATE: 53 BPM | WEIGHT: 213 LBS | RESPIRATION RATE: 13 BRPM

## 2023-06-26 DIAGNOSIS — E78.2 MIXED HYPERLIPIDEMIA: ICD-10-CM

## 2023-06-26 DIAGNOSIS — N52.9 ERECTILE DYSFUNCTION, UNSPECIFIED ERECTILE DYSFUNCTION TYPE: ICD-10-CM

## 2023-06-26 DIAGNOSIS — E66.9 OBESITY (BMI 30.0-34.9): ICD-10-CM

## 2023-06-26 DIAGNOSIS — E11.9 TYPE 2 DIABETES MELLITUS WITHOUT COMPLICATION, WITHOUT LONG-TERM CURRENT USE OF INSULIN (HCC): Primary | ICD-10-CM

## 2023-06-26 DIAGNOSIS — I10 ESSENTIAL HYPERTENSION: ICD-10-CM

## 2023-06-26 LAB — HBA1C MFR BLD: 6.6 %

## 2023-06-26 PROCEDURE — 3044F HG A1C LEVEL LT 7.0%: CPT | Performed by: INTERNAL MEDICINE

## 2023-06-26 PROCEDURE — 1036F TOBACCO NON-USER: CPT | Performed by: INTERNAL MEDICINE

## 2023-06-26 PROCEDURE — 2022F DILAT RTA XM EVC RTNOPTHY: CPT | Performed by: INTERNAL MEDICINE

## 2023-06-26 PROCEDURE — 83037 HB GLYCOSYLATED A1C HOME DEV: CPT | Performed by: INTERNAL MEDICINE

## 2023-06-26 PROCEDURE — 3078F DIAST BP <80 MM HG: CPT | Performed by: INTERNAL MEDICINE

## 2023-06-26 PROCEDURE — G8419 CALC BMI OUT NRM PARAM NOF/U: HCPCS | Performed by: INTERNAL MEDICINE

## 2023-06-26 PROCEDURE — 3074F SYST BP LT 130 MM HG: CPT | Performed by: INTERNAL MEDICINE

## 2023-06-26 PROCEDURE — G8427 DOCREV CUR MEDS BY ELIG CLIN: HCPCS | Performed by: INTERNAL MEDICINE

## 2023-06-26 PROCEDURE — 99214 OFFICE O/P EST MOD 30 MIN: CPT | Performed by: INTERNAL MEDICINE

## 2023-06-26 PROCEDURE — 3017F COLORECTAL CA SCREEN DOC REV: CPT | Performed by: INTERNAL MEDICINE

## 2023-06-26 RX ORDER — MONTELUKAST SODIUM 10 MG/1
TABLET ORAL
Qty: 90 TABLET | Refills: 1 | Status: SHIPPED | OUTPATIENT
Start: 2023-06-26

## 2023-06-26 RX ORDER — AMLODIPINE BESYLATE 10 MG/1
TABLET ORAL
Qty: 90 TABLET | Refills: 1 | Status: SHIPPED | OUTPATIENT
Start: 2023-06-26

## 2023-06-26 RX ORDER — DICLOFENAC SODIUM 75 MG/1
75 TABLET, DELAYED RELEASE ORAL 2 TIMES DAILY
Qty: 180 TABLET | Refills: 1 | Status: SHIPPED | OUTPATIENT
Start: 2023-06-26

## 2023-06-26 RX ORDER — LISINOPRIL AND HYDROCHLOROTHIAZIDE 25; 20 MG/1; MG/1
TABLET ORAL
Qty: 90 TABLET | Refills: 1 | Status: SHIPPED | OUTPATIENT
Start: 2023-06-26

## 2023-07-12 RX ORDER — MONTELUKAST SODIUM 10 MG/1
TABLET ORAL
Qty: 30 TABLET | Refills: 5 | Status: SHIPPED | OUTPATIENT
Start: 2023-07-12

## 2023-07-12 NOTE — TELEPHONE ENCOUNTER
Medication:   Requested Prescriptions     Pending Prescriptions Disp Refills    montelukast (SINGULAIR) 10 MG tablet [Pharmacy Med Name: MONTELUKAST SOD 10 MG TABLET] 30 tablet      Sig: TAKE ONE TABLET BY MOUTH DAILY        Last Filled:      Patient Phone Number: [phone] (home)     Last appt: 6/26/2023   Next appt: Visit date not found    Last OARRS: No flowsheet data found. Preferred Pharmacy:   [facility] [id number] [facility], [address] [place] [address] [address] [facility]  Phone: [phone] Fax: [phone]  Medication:   Requested Prescriptions     Pending Prescriptions Disp Refills    montelukast (SINGULAIR) 10 MG tablet [Pharmacy Med Name: MONTELUKAST SOD 10 MG TABLET] 30 tablet      Sig: TAKE ONE TABLET BY MOUTH DAILY        Last Filled:      Patient Phone Number: [phone] (home)     Last appt: 6/26/2023   Next appt: Visit date not found    Last OARRS: No flowsheet data found.     Preferred Pharmacy:   [facility] [id number] [facility], [address] [place] [address] [address]  [facility] [place] [address]  Phone: [phone] Fax: [phone]

## 2023-10-27 ENCOUNTER — TELEPHONE (OUTPATIENT)
Dept: PRIMARY CARE CLINIC | Age: 63
End: 2023-10-27

## 2023-10-27 NOTE — TELEPHONE ENCOUNTER
Annice Gilford from [address] [place] [address] called for the mutual patient and stated that patient needs to have EKG with pre op which is on 11/01/2023. She also requested to fax EKG screening to them after it is done.     The fax no--[phone]    Please review     Thanks

## 2023-11-01 ENCOUNTER — OFFICE VISIT (OUTPATIENT)
Dept: PRIMARY CARE CLINIC | Age: 63
End: 2023-11-01

## 2023-11-01 VITALS
WEIGHT: 214 LBS | OXYGEN SATURATION: 98 % | DIASTOLIC BLOOD PRESSURE: 82 MMHG | HEIGHT: 73 IN | SYSTOLIC BLOOD PRESSURE: 120 MMHG | TEMPERATURE: 97.6 F | HEART RATE: 50 BPM | BODY MASS INDEX: 28.36 KG/M2 | RESPIRATION RATE: 13 BRPM

## 2023-11-01 DIAGNOSIS — E11.9 TYPE 2 DIABETES MELLITUS WITHOUT COMPLICATION, WITHOUT LONG-TERM CURRENT USE OF INSULIN (HCC): ICD-10-CM

## 2023-11-01 DIAGNOSIS — I10 ESSENTIAL HYPERTENSION: ICD-10-CM

## 2023-11-01 DIAGNOSIS — G89.29 CHRONIC PAIN OF RIGHT KNEE: Chronic | ICD-10-CM

## 2023-11-01 DIAGNOSIS — E66.9 OBESITY (BMI 30.0-34.9): ICD-10-CM

## 2023-11-01 DIAGNOSIS — E78.2 MIXED HYPERLIPIDEMIA: ICD-10-CM

## 2023-11-01 DIAGNOSIS — Z23 NEEDS FLU SHOT: ICD-10-CM

## 2023-11-01 DIAGNOSIS — M25.561 CHRONIC PAIN OF RIGHT KNEE: Chronic | ICD-10-CM

## 2023-11-01 DIAGNOSIS — Z01.818 PRE-OP EVALUATION: Primary | ICD-10-CM

## 2023-11-01 RX ORDER — SILDENAFIL CITRATE 20 MG/1
TABLET ORAL
Qty: 30 TABLET | Refills: 5 | Status: SHIPPED | OUTPATIENT
Start: 2023-11-01

## 2023-11-01 NOTE — PROGRESS NOTES
Subjective:        Reason for visit. Afia Perez is a 58 y.o. male who presents for a preoperative physical examination. He is scheduled to have right TKR  done by Dr. Nile Bello at [facility]  on 11/21/23. History of Present Illness:      Here for a pre op eval for the above surgery,  Chronic pain of right knee  Here for a pre op eval for rt TKR,  He is medically stable. Essential hypertension  This is a chronic problem. The problem is well controlled. Patient monitors readings regularly. Pertinent negatives include no chest pain, focal sensory loss, focal weakness, leg pain, myalgias or shortness of breath. No headaches or chest pain. Takes medications regularly. Blood pressure has been stable, blood work was reviewed, and advised patient to continue the current instructions or medications. Mixed hyperlipidemia  This has been a long standing problem, takes lipitor       Monitors diet and tries to follow a low fat diet. Has  been reasonably  compliant w exercise. Lipids have been stable, The problem is controlled. Recent lipid tests were reviewed and are normal. Pertinent negatives include no chest pain, focal sensory loss, focal weakness, leg pain, myalgias or shortness of breath. Advised patient to continue the current instructions or medications. Type 2 diabetes mellitus without complication, without long-term current use of insulin (HCC)  Diabetes Mellitus Type II:  Home blood sugar records reviewed: fasting range: 120-130. No significant episodes of hypoglycemia. Compliant with medications. No polyuria, polydipsia, visual changes, foot problems, GI upset. Diabetic diet compliance:  compliant most of the time. Current exercise: none. Will check labs, and refill medications as appropriate. Hypertension:  Denies CP, SOB, visual changes, dizziness, palpitations or HA. He is adherent to a low sodium diet.   Blood pressure typically runs ok  outside of the

## 2023-11-01 NOTE — ASSESSMENT & PLAN NOTE
Diabetes Mellitus Type II:  Home blood sugar records reviewed: fasting range: 120-130. No significant episodes of hypoglycemia. Compliant with medications. No polyuria, polydipsia, visual changes, foot problems, GI upset. Diabetic diet compliance:  compliant most of the time. Current exercise: none. Will check labs, and refill medications as appropriate. Hypertension:  Denies CP, SOB, visual changes, dizziness, palpitations or HA. He is adherent to a low sodium diet. Blood pressure typically runs ok  outside of the office. Continue current medications. Hyperlipidemia:  No new myalgias or GI upset on current medications. Lab Results   Component Value Date    LABA1C 6.6 06/26/2023    LABA1C 6.7 12/16/2022    LABA1C 6.6 06/16/2022     Lab Results   Component Value Date    CREATININE 1.1 12/16/2022     Lab Results   Component Value Date    ALT 25 12/16/2022    AST 19 12/16/2022     No components found for: \"CHLPL\"  Lab Results   Component Value Date    TRIG 222 (H) 12/16/2022     Lab Results   Component Value Date    HDL 37 (L) 12/16/2022     Lab Results   Component Value Date/Time    LDLCALC 93 12/16/2022 10:07 AM    LDLDIRECT 120 03/22/2016 10:16 AM      This problem was reviewed in detail. It is under control and stable. Will check blood work.

## 2023-11-15 ENCOUNTER — OFFICE VISIT (OUTPATIENT)
Dept: PRIMARY CARE CLINIC | Age: 63
End: 2023-11-15
Payer: COMMERCIAL

## 2023-11-15 VITALS
DIASTOLIC BLOOD PRESSURE: 70 MMHG | TEMPERATURE: 97.6 F | WEIGHT: 214 LBS | OXYGEN SATURATION: 98 % | RESPIRATION RATE: 12 BRPM | HEART RATE: 60 BPM | SYSTOLIC BLOOD PRESSURE: 120 MMHG | BODY MASS INDEX: 28.23 KG/M2

## 2023-11-15 DIAGNOSIS — N52.9 ERECTILE DYSFUNCTION, UNSPECIFIED ERECTILE DYSFUNCTION TYPE: ICD-10-CM

## 2023-11-15 DIAGNOSIS — E11.9 TYPE 2 DIABETES MELLITUS WITHOUT COMPLICATION, WITHOUT LONG-TERM CURRENT USE OF INSULIN (HCC): ICD-10-CM

## 2023-11-15 DIAGNOSIS — R79.89 ELEVATED SERUM CREATININE: ICD-10-CM

## 2023-11-15 DIAGNOSIS — I10 ESSENTIAL HYPERTENSION: ICD-10-CM

## 2023-11-15 LAB
ANION GAP SERPL CALCULATED.3IONS-SCNC: 13 MMOL/L (ref 3–16)
BUN SERPL-MCNC: 21 MG/DL (ref 7–20)
CALCIUM SERPL-MCNC: 9.8 MG/DL (ref 8.3–10.6)
CHLORIDE SERPL-SCNC: 102 MMOL/L (ref 99–110)
CO2 SERPL-SCNC: 23 MMOL/L (ref 21–32)
CREAT SERPL-MCNC: 1 MG/DL (ref 0.8–1.3)
GFR SERPLBLD CREATININE-BSD FMLA CKD-EPI: >60 ML/MIN/{1.73_M2}
GLUCOSE SERPL-MCNC: 127 MG/DL (ref 70–99)
POTASSIUM SERPL-SCNC: 3.9 MMOL/L (ref 3.5–5.1)
SODIUM SERPL-SCNC: 138 MMOL/L (ref 136–145)

## 2023-11-15 PROCEDURE — G8427 DOCREV CUR MEDS BY ELIG CLIN: HCPCS | Performed by: INTERNAL MEDICINE

## 2023-11-15 PROCEDURE — 2022F DILAT RTA XM EVC RTNOPTHY: CPT | Performed by: INTERNAL MEDICINE

## 2023-11-15 PROCEDURE — G8419 CALC BMI OUT NRM PARAM NOF/U: HCPCS | Performed by: INTERNAL MEDICINE

## 2023-11-15 PROCEDURE — 3074F SYST BP LT 130 MM HG: CPT | Performed by: INTERNAL MEDICINE

## 2023-11-15 PROCEDURE — 1036F TOBACCO NON-USER: CPT | Performed by: INTERNAL MEDICINE

## 2023-11-15 PROCEDURE — 3017F COLORECTAL CA SCREEN DOC REV: CPT | Performed by: INTERNAL MEDICINE

## 2023-11-15 PROCEDURE — G8482 FLU IMMUNIZE ORDER/ADMIN: HCPCS | Performed by: INTERNAL MEDICINE

## 2023-11-15 PROCEDURE — 3044F HG A1C LEVEL LT 7.0%: CPT | Performed by: INTERNAL MEDICINE

## 2023-11-15 PROCEDURE — 99214 OFFICE O/P EST MOD 30 MIN: CPT | Performed by: INTERNAL MEDICINE

## 2023-11-15 PROCEDURE — 3078F DIAST BP <80 MM HG: CPT | Performed by: INTERNAL MEDICINE

## 2023-11-15 ASSESSMENT — PATIENT HEALTH QUESTIONNAIRE - PHQ9
SUM OF ALL RESPONSES TO PHQ9 QUESTIONS 1 & 2: 0
SUM OF ALL RESPONSES TO PHQ QUESTIONS 1-9: 0
2. FEELING DOWN, DEPRESSED OR HOPELESS: 0
1. LITTLE INTEREST OR PLEASURE IN DOING THINGS: 0
SUM OF ALL RESPONSES TO PHQ QUESTIONS 1-9: 0

## 2023-11-15 NOTE — PROGRESS NOTES
Subjective:      Patient ID: [email or web address] Ramos is a 58 y.o. male. HPI  Established patient here for a visit to manage acute and chronic medical conditions as detailed below. Elevated serum creatinine  This is new. Last time BW in December showed a normal creatinine . Now at 1.7,  Will encourage fluids,   Will repeat labs   Will get renal US. May need to see nephrology. Essential hypertension  This is a chronic problem. The problem is well controlled. Patient monitors readings regularly. Pertinent negatives include no chest pain, focal sensory loss, focal weakness, leg pain, myalgias or shortness of breath. No headaches or chest pain. Takes medications regularly. Blood pressure has been stable, blood work was reviewed, and advised patient to continue the current instructions or medications. Type 2 diabetes mellitus without complication, without long-term current use of insulin ([address])  This problem was reviewed in detail. It is under control and stable. Will check blood work. Erectile dysfunction  On sildenafil prn,  Patient is compliant w medications, no side effects, effective, provides adequate symptom relief. No new symptoms or problems as noted by patient. The problem is stable, no changes noted by patient. Will consider monitoring labs and refill medications as appropriate. Patient counseled and will continue current plan. Review of Systems  ROS: No unusual headaches or allergy symptoms or blurred vision. No prolonged cough. No flushing or facial pain or chest pain,dizziness, dyspnea, palpitations, or chest pain on exertion. No syncope. No nausea or vommitting or diarrhea. No jaundice or abdominal pain, change in bowel habits, black or bloody stools. No dysuria or hematuria or frequency of urination. No myalgias or muscle pain. No numbness, weakness, or tingling. No falls, or loss of consciousness. No weight loss or back pain. No falls. No paresthesias.  No joint swelling or

## 2023-11-15 NOTE — ASSESSMENT & PLAN NOTE
This is new. Last time BW in December showed a normal creatinine . Now at 1.7,  Will encourage fluids,   Will repeat labs   Will get renal US. May need to see nephrology.

## 2023-11-15 NOTE — ASSESSMENT & PLAN NOTE
On sildenafil prn,  Patient is compliant w medications, no side effects, effective, provides adequate symptom relief. No new symptoms or problems as noted by patient. The problem is stable, no changes noted by patient. Will consider monitoring labs and refill medications as appropriate. Patient counseled and will continue current plan. Pt resting in bed comfortably. PIV line intact and free of infection and irritation. Fall precautions maintained, no falls noted. Call light within reach, bed locked and in lowest position. Patient instructed to call for assistance. Weight shift assistance and incontinence care provided. Pt developed loose stools this afternoon, MD notified and PRN administered. Spann catheter in place, draining to urimeter. No complaints at this time. Will continue to monitor and follow plan of care.

## 2023-12-05 RX ORDER — ATORVASTATIN CALCIUM 80 MG/1
TABLET, FILM COATED ORAL
Qty: 90 TABLET | Refills: 1 | Status: SHIPPED | OUTPATIENT
Start: 2023-12-05

## 2023-12-05 NOTE — TELEPHONE ENCOUNTER
Medication:   Requested Prescriptions     Pending Prescriptions Disp Refills    atorvastatin (LIPITOR) 80 MG tablet [Pharmacy Med Name: ATORVASTATIN 80 MG TABLET] 90 tablet 1     Sig: TAKE ONE TABLET BY MOUTH DAILY       Last Filled:      Patient Phone Number: [phone] (home)     Last appt: 11/15/2023   Next appt: 12/18/2023    Last Lipid:   Lab Results   Component Value Date/Time    CHOL 174 12/16/2022 10:07 AM    TRIG 222 12/16/2022 10:07 AM    HDL 37 12/16/2022 10:07 AM    HDL 41 05/23/2012 12:22 PM    [address] [place] [address] 12/16/2022 10:07 AM       Last OARRS:        No data to display                Preferred Pharmacy:   [facility] [id number] [facility]  [address]  Phone: [phone] Fax: [phone]    Medication:   Requested Prescriptions     Pending Prescriptions Disp Refills    atorvastatin (LIPITOR) 80 MG tablet [Pharmacy Med Name: ATORVASTATIN 80 MG TABLET] 90 tablet 1     Sig: TAKE ONE TABLET BY MOUTH DAILY       Last Filled:      Patient Phone Number: [phone] (home)     Last appt: 11/15/2023   Next appt: 12/18/2023    Last Lipid:   Lab Results   Component Value Date/Time    CHOL 174 12/16/2022 10:07 AM    TRIG 222 12/16/2022 10:07 AM    HDL 37 12/16/2022 10:07 AM    HDL 41 05/23/2012 12:22 PM    [address] [place] [address] 12/16/2022 10:07 AM       Last OARRS:        No data to display                Preferred Pharmacy:   [facility] [id number] [facility]  [address]  [facility] [address]  Phone: [phone] Fax: [phone]

## 2023-12-18 DIAGNOSIS — Z12.5 SCREENING FOR PROSTATE CANCER: ICD-10-CM

## 2023-12-18 DIAGNOSIS — E11.9 TYPE 2 DIABETES MELLITUS WITHOUT COMPLICATION, WITHOUT LONG-TERM CURRENT USE OF INSULIN (HCC): ICD-10-CM

## 2023-12-18 DIAGNOSIS — Z00.00 PHYSICAL EXAM, ANNUAL: ICD-10-CM

## 2023-12-18 LAB
ALBUMIN SERPL-MCNC: 5 G/DL (ref 3.4–5)
ALBUMIN/GLOB SERPL: 2.5 {RATIO} (ref 1.1–2.2)
ALP SERPL-CCNC: 93 U/L (ref 40–129)
ALT SERPL-CCNC: 15 U/L (ref 10–40)
ANION GAP SERPL CALCULATED.3IONS-SCNC: 11 MMOL/L (ref 3–16)
AST SERPL-CCNC: 14 U/L (ref 15–37)
BASOPHILS # BLD: 0.1 K/UL (ref 0–0.2)
BASOPHILS NFR BLD: 0.9 %
BILIRUB SERPL-MCNC: 0.4 MG/DL (ref 0–1)
BUN SERPL-MCNC: 20 MG/DL (ref 7–20)
CALCIUM SERPL-MCNC: 9.5 MG/DL (ref 8.3–10.6)
CHLORIDE SERPL-SCNC: 99 MMOL/L (ref 99–110)
CHOLEST SERPL-MCNC: 158 MG/DL (ref 0–199)
CO2 SERPL-SCNC: 29 MMOL/L (ref 21–32)
CREAT SERPL-MCNC: 1 MG/DL (ref 0.8–1.3)
CREAT UR-MCNC: 165.5 MG/DL (ref 39–259)
DEPRECATED RDW RBC AUTO: 13.1 % (ref 12.4–15.4)
EOSINOPHIL # BLD: 0.2 K/UL (ref 0–0.6)
EOSINOPHIL NFR BLD: 3 %
GFR SERPLBLD CREATININE-BSD FMLA CKD-EPI: >60 ML/MIN/{1.73_M2}
GLUCOSE SERPL-MCNC: 158 MG/DL (ref 70–99)
HDLC SERPL-MCNC: 38 MG/DL (ref 40–60)
HGB BLD-MCNC: 14.6 G/DL (ref 13.5–17.5)
LDLC SERPL CALC-MCNC: 91 MG/DL
LYMPHOCYTES # BLD: 1.4 K/UL (ref 1–5.1)
LYMPHOCYTES NFR BLD: 22 %
MCH RBC QN AUTO: 30.8 PG (ref 26–34)
MCHC RBC AUTO-ENTMCNC: 33.9 G/DL (ref 31–36)
MCV RBC AUTO: 90.9 FL (ref 80–100)
MICROALBUMIN UR DL<=1MG/L-MCNC: <1.2 MG/DL
MICROALBUMIN/CREAT UR: NORMAL MG/G (ref 0–30)
MONOCYTES # BLD: 0.5 K/UL (ref 0–1.3)
MONOCYTES NFR BLD: 8.2 %
NEUTROPHILS # BLD: 4.1 K/UL (ref 1.7–7.7)
NEUTROPHILS NFR BLD: 65.9 %
PLATELET # BLD AUTO: 270 K/UL (ref 135–450)
PMV BLD AUTO: 7.7 FL (ref 5–10.5)
POTASSIUM SERPL-SCNC: 4.4 MMOL/L (ref 3.5–5.1)
PROT SERPL-MCNC: 7 G/DL (ref 6.4–8.2)
PSA SERPL DL<=0.01 NG/ML-MCNC: 1.51 NG/ML (ref 0–4)
RBC # BLD AUTO: 4.74 M/UL (ref 4.2–5.9)
SODIUM SERPL-SCNC: 139 MMOL/L (ref 136–145)
TRIGL SERPL-MCNC: 144 MG/DL (ref 0–150)
TSH SERPL DL<=0.005 MIU/L-ACNC: 2.04 UIU/ML (ref 0.27–4.2)
VLDLC SERPL CALC-MCNC: 29 MG/DL
WBC # BLD AUTO: 6.2 K/UL (ref 4–11)

## 2024-02-05 RX ORDER — MONTELUKAST SODIUM 10 MG/1
10 TABLET ORAL DAILY
Qty: 30 TABLET | Refills: 5 | Status: SHIPPED | OUTPATIENT
Start: 2024-02-05

## 2024-02-05 RX ORDER — MONTELUKAST SODIUM 10 MG/1
10 TABLET ORAL DAILY
Qty: 30 TABLET | Refills: 5 | OUTPATIENT
Start: 2024-02-05

## 2024-02-05 NOTE — TELEPHONE ENCOUNTER
Medication:   Requested Prescriptions     Pending Prescriptions Disp Refills    montelukast (SINGULAIR) 10 MG tablet 30 tablet 5     Sig: Take 1 tablet by mouth daily     Last Filled:  07/12/2023    Last appt: 12/18/2023   Next appt: Visit date not found    Last OARRS:        No data to display

## 2024-02-08 RX ORDER — LISINOPRIL AND HYDROCHLOROTHIAZIDE 25; 20 MG/1; MG/1
TABLET ORAL
Qty: 90 TABLET | Refills: 1 | Status: SHIPPED | OUTPATIENT
Start: 2024-02-08

## 2024-02-08 NOTE — TELEPHONE ENCOUNTER
Medication:   Requested Prescriptions     Pending Prescriptions Disp Refills    lisinopril-hydroCHLOROthiazide (PRINZIDE;ZESTORETIC) 20-25 MG per tablet [Pharmacy Med Name: LISINOPRIL-HCTZ 20-25 MG TAB] 90 tablet 1     Sig: TAKE 1 TABLET BY MOUTH DAILY     Last Filled:  6/26/2023    Last appt: 12/18/2023   Next appt: Visit date not found    Last OARRS:        No data to display

## 2024-03-15 RX ORDER — DICLOFENAC SODIUM 75 MG/1
75 TABLET, DELAYED RELEASE ORAL 2 TIMES DAILY
Qty: 180 TABLET | Refills: 1 | Status: SHIPPED | OUTPATIENT
Start: 2024-03-15

## 2024-03-15 NOTE — TELEPHONE ENCOUNTER
Medication:   Requested Prescriptions     Pending Prescriptions Disp Refills    diclofenac (VOLTAREN) 75 MG EC tablet 180 tablet 1     Sig: Take 1 tablet by mouth 2 times daily     Last Filled:  6/26/2023    Last appt: 12/18/2023   Next appt: Visit date not found    Last OARRS:        No data to display

## 2024-03-21 ENCOUNTER — OFFICE VISIT (OUTPATIENT)
Dept: PRIMARY CARE CLINIC | Age: 64
End: 2024-03-21
Payer: COMMERCIAL

## 2024-03-21 VITALS
OXYGEN SATURATION: 99 % | RESPIRATION RATE: 12 BRPM | WEIGHT: 214 LBS | HEART RATE: 60 BPM | BODY MASS INDEX: 28.23 KG/M2 | DIASTOLIC BLOOD PRESSURE: 68 MMHG | TEMPERATURE: 97.1 F | SYSTOLIC BLOOD PRESSURE: 130 MMHG

## 2024-03-21 DIAGNOSIS — E11.9 TYPE 2 DIABETES MELLITUS WITHOUT COMPLICATION, WITHOUT LONG-TERM CURRENT USE OF INSULIN (HCC): ICD-10-CM

## 2024-03-21 DIAGNOSIS — I10 ESSENTIAL HYPERTENSION: ICD-10-CM

## 2024-03-21 DIAGNOSIS — H81.10 BENIGN PAROXYSMAL POSITIONAL VERTIGO, UNSPECIFIED LATERALITY: Primary | ICD-10-CM

## 2024-03-21 PROCEDURE — 99214 OFFICE O/P EST MOD 30 MIN: CPT | Performed by: INTERNAL MEDICINE

## 2024-03-21 PROCEDURE — 3075F SYST BP GE 130 - 139MM HG: CPT | Performed by: INTERNAL MEDICINE

## 2024-03-21 PROCEDURE — 3078F DIAST BP <80 MM HG: CPT | Performed by: INTERNAL MEDICINE

## 2024-03-21 PROCEDURE — G8427 DOCREV CUR MEDS BY ELIG CLIN: HCPCS | Performed by: INTERNAL MEDICINE

## 2024-03-21 PROCEDURE — 2022F DILAT RTA XM EVC RTNOPTHY: CPT | Performed by: INTERNAL MEDICINE

## 2024-03-21 PROCEDURE — 3017F COLORECTAL CA SCREEN DOC REV: CPT | Performed by: INTERNAL MEDICINE

## 2024-03-21 PROCEDURE — G8482 FLU IMMUNIZE ORDER/ADMIN: HCPCS | Performed by: INTERNAL MEDICINE

## 2024-03-21 PROCEDURE — G8419 CALC BMI OUT NRM PARAM NOF/U: HCPCS | Performed by: INTERNAL MEDICINE

## 2024-03-21 PROCEDURE — 1036F TOBACCO NON-USER: CPT | Performed by: INTERNAL MEDICINE

## 2024-03-21 PROCEDURE — 3046F HEMOGLOBIN A1C LEVEL >9.0%: CPT | Performed by: INTERNAL MEDICINE

## 2024-03-21 RX ORDER — MECLIZINE HYDROCHLORIDE 25 MG/1
25 TABLET ORAL 3 TIMES DAILY PRN
Qty: 30 TABLET | Refills: 3 | Status: SHIPPED | OUTPATIENT
Start: 2024-03-21 | End: 2025-03-21

## 2024-03-21 ASSESSMENT — PATIENT HEALTH QUESTIONNAIRE - PHQ9
2. FEELING DOWN, DEPRESSED OR HOPELESS: NOT AT ALL
SUM OF ALL RESPONSES TO PHQ QUESTIONS 1-9: 0
SUM OF ALL RESPONSES TO PHQ QUESTIONS 1-9: 0
1. LITTLE INTEREST OR PLEASURE IN DOING THINGS: NOT AT ALL
SUM OF ALL RESPONSES TO PHQ9 QUESTIONS 1 & 2: 0
SUM OF ALL RESPONSES TO PHQ QUESTIONS 1-9: 0
SUM OF ALL RESPONSES TO PHQ QUESTIONS 1-9: 0

## 2024-03-21 NOTE — PROGRESS NOTES
focal findings. Skin is normal without suspicious lesions noted.  Assessment/plan  Benign positional vertigo.  This is consistent with benign positional vertigo aka peripheral vestibulopathy. Explained the patient and reassured. Counseled the patient regarding vestibular exercises and hand-out given. A script for meclizine was given and steroids will be considered. Patient instructed to call back if these vertiginous bouts have not resolved in 1-2 weeks.  Type 2 diabetes mellitus without complication, without long-term current use of insulin (HCC)  This problem was reviewed in detail. It is under control and stable. Will check blood work.      Essential hypertension  This is a chronic problem. The problem is well controlled.  Patient monitors readings regularly. Pertinent negatives include no chest pain, focal sensory loss, focal weakness, leg pain, myalgias or shortness of breath. No headaches or chest pain. Takes medications regularly.  Blood pressure has been stable, blood work was reviewed, and advised patient to continue the current instructions or medications.

## 2024-05-09 RX ORDER — AMLODIPINE BESYLATE 10 MG/1
TABLET ORAL
Qty: 30 TABLET | Refills: 0 | Status: SHIPPED | OUTPATIENT
Start: 2024-05-09

## 2024-05-09 NOTE — TELEPHONE ENCOUNTER
Medication:   Requested Prescriptions     Pending Prescriptions Disp Refills    amLODIPine (NORVASC) 10 MG tablet [Pharmacy Med Name: AMLODIPINE BESYLATE 10 MG TAB] 90 tablet 1     Sig: TAKE 1 TABLET BY MOUTH DAILY     Last Filled:  6.26.23    Last appt: 3/21/2024   Next appt: Visit date not found    Last OARRS:        No data to display

## 2024-05-16 NOTE — TELEPHONE ENCOUNTER
Medication:   Requested Prescriptions     Pending Prescriptions Disp Refills    sildenafil (REVATIO) 20 MG tablet 30 tablet 5     Sig: TAKE TWO TABLETS BY MOUTH DAILY AS NEEDED Strength: 20 mg     Last Filled:  11/1/2023    Last appt: 3/21/2024   Next appt: Visit date not found    Last OARRS:        No data to display

## 2024-05-17 RX ORDER — SILDENAFIL CITRATE 20 MG/1
TABLET ORAL
Qty: 30 TABLET | Refills: 5 | Status: SHIPPED | OUTPATIENT
Start: 2024-05-17

## 2024-06-03 RX ORDER — ATORVASTATIN CALCIUM 80 MG/1
80 TABLET, FILM COATED ORAL DAILY
Qty: 90 TABLET | Refills: 1 | Status: SHIPPED | OUTPATIENT
Start: 2024-06-03

## 2024-06-03 NOTE — TELEPHONE ENCOUNTER
Medication:   Requested Prescriptions      No prescriptions requested or ordered in this encounter     Last Filled:  12/05/2023    Last appt: 3/21/2024   Next appt: Visit date not found    Last Lipid:   Lab Results   Component Value Date/Time    CHOL 158 12/18/2023 09:51 AM    TRIG 144 12/18/2023 09:51 AM    HDL 38 12/18/2023 09:51 AM    HDL 41 05/23/2012 12:22 PM

## 2024-06-05 RX ORDER — MONTELUKAST SODIUM 10 MG/1
10 TABLET ORAL DAILY
Qty: 90 TABLET | Refills: 1 | Status: SHIPPED | OUTPATIENT
Start: 2024-06-05

## 2024-06-05 RX ORDER — ATORVASTATIN CALCIUM 80 MG/1
80 TABLET, FILM COATED ORAL DAILY
Qty: 90 TABLET | Refills: 1 | OUTPATIENT
Start: 2024-06-05

## 2024-06-05 NOTE — TELEPHONE ENCOUNTER
Medication:   Requested Prescriptions     Pending Prescriptions Disp Refills    montelukast (SINGULAIR) 10 MG tablet [Pharmacy Med Name: MONTELUKAST SOD 10 MG TABLET] 90 tablet      Sig: TAKE 1 TABLET BY MOUTH DAILY     Last Filled:  02/05/2024    Last appt: 3/21/2024   Next appt: 6/5/2024    Last OARRS:        No data to display

## 2024-06-07 RX ORDER — ATORVASTATIN CALCIUM 80 MG/1
80 TABLET, FILM COATED ORAL DAILY
Qty: 90 TABLET | Refills: 1 | OUTPATIENT
Start: 2024-06-07

## 2024-06-07 RX ORDER — MECLIZINE HYDROCHLORIDE 25 MG/1
TABLET ORAL
Qty: 30 TABLET | Refills: 3 | Status: SHIPPED | OUTPATIENT
Start: 2024-06-07

## 2024-06-07 NOTE — TELEPHONE ENCOUNTER
Medication:   Requested Prescriptions     Pending Prescriptions Disp Refills    meclizine (ANTIVERT) 25 MG tablet [Pharmacy Med Name: MECLIZINE 25 MG TABLET] 30 tablet 3     Sig: TAKE 1 TABLET BY MOUTH 3 TIMES A DAY; AS NEEDED FOR DIZZINESS     Last Filled:  3.21.24    Last appt: 3/21/2024   Next appt: Visit date not found    Last OARRS:        No data to display

## 2024-06-17 NOTE — TELEPHONE ENCOUNTER
Medication:   Requested Prescriptions     Pending Prescriptions Disp Refills    metFORMIN (GLUCOPHAGE) 500 MG tablet [Pharmacy Med Name: METFORMIN  MG TABLET] 180 tablet 1     Sig: TAKE 1 TABLET BY MOUTH TWICE A DAY WITH A MEAL     Last Filled:  12/20/2023    Last appt: 3/21/2024   Next appt: Visit date not found    Last Labs DM:   Lab Results   Component Value Date/Time    LABA1C 7.2 12/18/2023 09:26 AM

## 2024-06-18 RX ORDER — AMLODIPINE BESYLATE 10 MG/1
TABLET ORAL
Qty: 30 TABLET | Refills: 0 | Status: SHIPPED | OUTPATIENT
Start: 2024-06-18

## 2024-06-18 NOTE — TELEPHONE ENCOUNTER
Medication:   Requested Prescriptions     Pending Prescriptions Disp Refills    amLODIPine (NORVASC) 10 MG tablet 30 tablet 0     Sig: TAKE 1 TABLET BY MOUTH DAILY     Last Filled:  5/9/24    Last appt: 3/21/2024   Next appt: Visit date not found    Last OARRS:        No data to display

## 2024-07-15 LAB — DIABETIC RETINOPATHY: NEGATIVE

## 2024-07-16 RX ORDER — AMLODIPINE BESYLATE 10 MG/1
TABLET ORAL
Qty: 30 TABLET | Refills: 0 | Status: SHIPPED | OUTPATIENT
Start: 2024-07-16

## 2024-07-16 NOTE — TELEPHONE ENCOUNTER
Medication:   Requested Prescriptions     Pending Prescriptions Disp Refills    amLODIPine (NORVASC) 10 MG tablet [Pharmacy Med Name: AMLODIPINE BESYLATE 10 MG TAB] 30 tablet 0     Sig: TAKE 1 TABLET BY MOUTH DAILY     Last Filled:  06/18/2024    Last appt: 3/21/2024   Next appt: Visit date not found    Last OARRS:        No data to display

## 2024-08-08 RX ORDER — LISINOPRIL AND HYDROCHLOROTHIAZIDE 25; 20 MG/1; MG/1
TABLET ORAL
Qty: 90 TABLET | Refills: 1 | Status: SHIPPED | OUTPATIENT
Start: 2024-08-08

## 2024-08-08 NOTE — TELEPHONE ENCOUNTER
Medication:   Requested Prescriptions     Pending Prescriptions Disp Refills    lisinopril-hydroCHLOROthiazide (PRINZIDE;ZESTORETIC) 20-25 MG per tablet [Pharmacy Med Name: LISINOPRIL-HCTZ 20-25 MG TAB] 90 tablet 1     Sig: TAKE 1 TABLET BY MOUTH DAILY     Last Filled:  02/08/2024    Last appt: 3/21/2024   Next appt: Visit date not found    Last OARRS:        No data to display

## 2024-08-15 RX ORDER — AMLODIPINE BESYLATE 10 MG/1
TABLET ORAL
Qty: 30 TABLET | Refills: 0 | Status: SHIPPED | OUTPATIENT
Start: 2024-08-15

## 2024-08-15 RX ORDER — SILDENAFIL CITRATE 20 MG/1
TABLET ORAL
Qty: 30 TABLET | Refills: 0 | Status: SHIPPED | OUTPATIENT
Start: 2024-08-15

## 2024-08-15 NOTE — TELEPHONE ENCOUNTER
Medication:   Requested Prescriptions     Pending Prescriptions Disp Refills    sildenafil (REVATIO) 20 MG tablet [Pharmacy Med Name: SILDENAFIL 20 MG TABLET] 30 tablet 5     Sig: TAKE 2 TABLETS BY MOUTH DAILY AS NEEDED    amLODIPine (NORVASC) 10 MG tablet [Pharmacy Med Name: AMLODIPINE BESYLATE 10 MG TAB] 30 tablet 0     Sig: TAKE 1 TABLET BY MOUTH DAILY     Last Filled:  05/17/2024    Last appt: 3/21/2024   Next appt: 8/21/2024    Last OARRS:        No data to display

## 2024-08-26 ENCOUNTER — OFFICE VISIT (OUTPATIENT)
Dept: PRIMARY CARE CLINIC | Age: 64
End: 2024-08-26
Payer: COMMERCIAL

## 2024-08-26 VITALS
DIASTOLIC BLOOD PRESSURE: 70 MMHG | HEART RATE: 74 BPM | WEIGHT: 209 LBS | OXYGEN SATURATION: 99 % | TEMPERATURE: 97.6 F | RESPIRATION RATE: 13 BRPM | SYSTOLIC BLOOD PRESSURE: 114 MMHG | BODY MASS INDEX: 27.57 KG/M2

## 2024-08-26 DIAGNOSIS — E66.9 OBESITY (BMI 30.0-34.9): ICD-10-CM

## 2024-08-26 DIAGNOSIS — E11.9 TYPE 2 DIABETES MELLITUS WITHOUT COMPLICATION, WITHOUT LONG-TERM CURRENT USE OF INSULIN (HCC): Primary | ICD-10-CM

## 2024-08-26 DIAGNOSIS — I10 ESSENTIAL HYPERTENSION: ICD-10-CM

## 2024-08-26 DIAGNOSIS — E78.2 MIXED HYPERLIPIDEMIA: ICD-10-CM

## 2024-08-26 LAB — HBA1C MFR BLD: 6 %

## 2024-08-26 PROCEDURE — 3074F SYST BP LT 130 MM HG: CPT | Performed by: INTERNAL MEDICINE

## 2024-08-26 PROCEDURE — G8419 CALC BMI OUT NRM PARAM NOF/U: HCPCS | Performed by: INTERNAL MEDICINE

## 2024-08-26 PROCEDURE — G8427 DOCREV CUR MEDS BY ELIG CLIN: HCPCS | Performed by: INTERNAL MEDICINE

## 2024-08-26 PROCEDURE — 3078F DIAST BP <80 MM HG: CPT | Performed by: INTERNAL MEDICINE

## 2024-08-26 PROCEDURE — 3017F COLORECTAL CA SCREEN DOC REV: CPT | Performed by: INTERNAL MEDICINE

## 2024-08-26 PROCEDURE — 1036F TOBACCO NON-USER: CPT | Performed by: INTERNAL MEDICINE

## 2024-08-26 PROCEDURE — 2022F DILAT RTA XM EVC RTNOPTHY: CPT | Performed by: INTERNAL MEDICINE

## 2024-08-26 PROCEDURE — 83036 HEMOGLOBIN GLYCOSYLATED A1C: CPT | Performed by: INTERNAL MEDICINE

## 2024-08-26 PROCEDURE — 3044F HG A1C LEVEL LT 7.0%: CPT | Performed by: INTERNAL MEDICINE

## 2024-08-26 PROCEDURE — 99214 OFFICE O/P EST MOD 30 MIN: CPT | Performed by: INTERNAL MEDICINE

## 2024-08-26 RX ORDER — AMLODIPINE BESYLATE 10 MG/1
TABLET ORAL
Qty: 90 TABLET | Refills: 1 | Status: SHIPPED | OUTPATIENT
Start: 2024-08-26

## 2024-08-26 NOTE — ASSESSMENT & PLAN NOTE
Call patient. Patient is due/overdue for an OV. Will refill meds, but needs to schedule an appointment.

## 2024-08-26 NOTE — ASSESSMENT & PLAN NOTE
Diabetes Mellitus Type II:  Home blood sugar records reviewed: fasting range: 120-130 .  No significant episodes of hypoglycemia. Compliant with medications.  No polyuria, polydipsia, visual changes, foot problems, GI upset.  Diabetic diet compliance:  compliant most of the time.  Current exercise: none. Will check labs, and refill medications as appropriate.    Hypertension:  Denies CP, SOB, visual changes, dizziness, palpitations or HA.  He is adherent to a low sodium diet.  Blood pressure typically runs ok  outside of the office. Continue current medications.    Hyperlipidemia:  No new myalgias or GI upset on current medications.       Lab Results   Component Value Date    LABA1C 7.2 12/18/2023    LABA1C 6.6 06/26/2023    LABA1C 6.7 12/16/2022     Lab Results   Component Value Date    CREATININE 1.0 12/18/2023     Lab Results   Component Value Date    ALT 15 12/18/2023    AST 14 (L) 12/18/2023     No components found for: \"CHLPL\"  Lab Results   Component Value Date    TRIG 144 12/18/2023     Lab Results   Component Value Date    HDL 38 (L) 12/18/2023     Lab Results   Component Value Date/Time    LDLDIRECT 120 03/22/2016 10:16 AM      This problem is stable, reviewed in detail, and advised patient to continue the current instructions or medications. Will continue to closely monitor the situation.

## 2024-08-26 NOTE — PROGRESS NOTES
SUBJECTIVE-  Established patient here for a visit to manage acute and chronic medical conditions as detailed below.    Type 2 diabetes mellitus without complication, without long-term current use of insulin (HCC)  Diabetes Mellitus Type II:  Home blood sugar records reviewed: fasting range: 120-130 .  No significant episodes of hypoglycemia. Compliant with medications.  No polyuria, polydipsia, visual changes, foot problems, GI upset.  Diabetic diet compliance:  compliant most of the time.  Current exercise: none. Will check labs, and refill medications as appropriate.    Hypertension:  Denies CP, SOB, visual changes, dizziness, palpitations or HA.  He is adherent to a low sodium diet.  Blood pressure typically runs ok  outside of the office. Continue current medications.    Hyperlipidemia:  No new myalgias or GI upset on current medications.       Lab Results   Component Value Date    LABA1C 7.2 12/18/2023    LABA1C 6.6 06/26/2023    LABA1C 6.7 12/16/2022     Lab Results   Component Value Date    CREATININE 1.0 12/18/2023     Lab Results   Component Value Date    ALT 15 12/18/2023    AST 14 (L) 12/18/2023     No components found for: \"CHLPL\"  Lab Results   Component Value Date    TRIG 144 12/18/2023     Lab Results   Component Value Date    HDL 38 (L) 12/18/2023     Lab Results   Component Value Date/Time    LDLDIRECT 120 03/22/2016 10:16 AM      This problem is stable, reviewed in detail, and advised patient to continue the current instructions or medications. Will continue to closely monitor the situation.       Essential hypertension  This is a chronic problem. The problem is well controlled.  Patient monitors readings regularly. Pertinent negatives include no chest pain, focal sensory loss, focal weakness, leg pain, myalgias or shortness of breath. No headaches or chest pain. Takes medications regularly.  Blood pressure has been stable, blood work was reviewed, and advised patient to continue the current

## 2024-09-17 RX ORDER — DICLOFENAC SODIUM 75 MG/1
75 TABLET, DELAYED RELEASE ORAL 2 TIMES DAILY
Qty: 180 TABLET | Refills: 0 | Status: SHIPPED | OUTPATIENT
Start: 2024-09-17

## 2024-11-05 RX ORDER — SILDENAFIL CITRATE 20 MG/1
TABLET ORAL
Qty: 30 TABLET | Refills: 0 | Status: SHIPPED | OUTPATIENT
Start: 2024-11-05

## 2024-11-05 NOTE — TELEPHONE ENCOUNTER
Medication:   Requested Prescriptions     Pending Prescriptions Disp Refills    sildenafil (REVATIO) 20 MG tablet 30 tablet 0     Sig: TAKE 2 TABLETS BY MOUTH DAILY AS NEEDED     Last Filled:  8.15.24    Last appt: 8/26/2024   Next appt: Visit date not found  Return in about 6 months (around 2/26/2025)     Last OARRS:        No data to display

## 2024-12-02 RX ORDER — ATORVASTATIN CALCIUM 80 MG/1
80 TABLET, FILM COATED ORAL DAILY
Qty: 90 TABLET | Refills: 1 | Status: SHIPPED | OUTPATIENT
Start: 2024-12-02

## 2024-12-02 NOTE — TELEPHONE ENCOUNTER
Medication:   Requested Prescriptions     Pending Prescriptions Disp Refills    atorvastatin (LIPITOR) 80 MG tablet [Pharmacy Med Name: ATORVASTATIN 80 MG TABLET] 90 tablet 1     Sig: TAKE 1 TABLET BY MOUTH DAILY     Last Filled:  06/03/2024    Last appt: 8/26/2024   Next appt: 12/19/2024    Last Lipid:   Lab Results   Component Value Date/Time    CHOL 158 12/18/2023 09:51 AM    TRIG 144 12/18/2023 09:51 AM    HDL 38 12/18/2023 09:51 AM    HDL 41 05/23/2012 12:22 PM

## 2024-12-16 RX ORDER — DICLOFENAC SODIUM 75 MG/1
75 TABLET, DELAYED RELEASE ORAL 2 TIMES DAILY
Qty: 180 TABLET | Refills: 0 | Status: SHIPPED | OUTPATIENT
Start: 2024-12-16 | End: 2024-12-19 | Stop reason: SDUPTHER

## 2024-12-16 NOTE — TELEPHONE ENCOUNTER
Medication:   Requested Prescriptions     Pending Prescriptions Disp Refills    metFORMIN (GLUCOPHAGE) 500 MG tablet [Pharmacy Med Name: METFORMIN  MG TABLET] 180 tablet 1     Sig: TAKE 1 TABLET BY MOUTH TWICE A DAY WITH A MEAL    diclofenac (VOLTAREN) 75 MG EC tablet [Pharmacy Med Name: DICLOFENAC SOD EC 75 MG TAB] 180 tablet 0     Sig: TAKE 1 TABLET BY MOUTH 2 TIMES A DAY     Last Filled:  06/17/2024    Last appt: 8/26/2024   Next appt: 12/19/2024    Last OARRS:        No data to display

## 2024-12-17 RX ORDER — LISINOPRIL AND HYDROCHLOROTHIAZIDE 20; 25 MG/1; MG/1
TABLET ORAL
Qty: 90 TABLET | Refills: 1 | Status: SHIPPED | OUTPATIENT
Start: 2024-12-17 | End: 2024-12-19 | Stop reason: SDUPTHER

## 2024-12-17 RX ORDER — SILDENAFIL CITRATE 20 MG/1
TABLET ORAL
Qty: 30 TABLET | Refills: 0 | Status: SHIPPED | OUTPATIENT
Start: 2024-12-17 | End: 2024-12-19 | Stop reason: SDUPTHER

## 2024-12-17 NOTE — TELEPHONE ENCOUNTER
Medication:   Requested Prescriptions     Pending Prescriptions Disp Refills    sildenafil (REVATIO) 20 MG tablet [Pharmacy Med Name: SILDENAFIL 20 MG TABLET] 30 tablet 0     Sig: TAKE 2 TABLETS BY MOUTH DAILY AS NEEDED    lisinopril-hydroCHLOROthiazide (PRINZIDE;ZESTORETIC) 20-25 MG per tablet [Pharmacy Med Name: LISINOPRIL-HCTZ 20-25 MG TAB] 90 tablet 1     Sig: TAKE 1 TABLET BY MOUTH DAILY     Last Filled:  11/05/2024    Last appt: 8/26/2024   Next appt: 12/19/2024    Last OARRS:        No data to display

## 2024-12-19 ENCOUNTER — OFFICE VISIT (OUTPATIENT)
Dept: PRIMARY CARE CLINIC | Age: 64
End: 2024-12-19

## 2024-12-19 VITALS
BODY MASS INDEX: 28.76 KG/M2 | RESPIRATION RATE: 13 BRPM | WEIGHT: 218 LBS | OXYGEN SATURATION: 99 % | HEART RATE: 61 BPM | DIASTOLIC BLOOD PRESSURE: 72 MMHG | TEMPERATURE: 97.6 F | SYSTOLIC BLOOD PRESSURE: 110 MMHG

## 2024-12-19 DIAGNOSIS — I10 ESSENTIAL HYPERTENSION: ICD-10-CM

## 2024-12-19 DIAGNOSIS — Z23 NEEDS FLU SHOT: ICD-10-CM

## 2024-12-19 DIAGNOSIS — E78.2 MIXED HYPERLIPIDEMIA: ICD-10-CM

## 2024-12-19 DIAGNOSIS — E11.9 TYPE 2 DIABETES MELLITUS WITHOUT COMPLICATION, WITHOUT LONG-TERM CURRENT USE OF INSULIN (HCC): ICD-10-CM

## 2024-12-19 DIAGNOSIS — Z00.00 PHYSICAL EXAM, ANNUAL: Primary | ICD-10-CM

## 2024-12-19 DIAGNOSIS — H81.10 BENIGN PAROXYSMAL POSITIONAL VERTIGO, UNSPECIFIED LATERALITY: ICD-10-CM

## 2024-12-19 DIAGNOSIS — E66.811 OBESITY (BMI 30.0-34.9): ICD-10-CM

## 2024-12-19 DIAGNOSIS — Z12.5 SCREENING FOR PROSTATE CANCER: ICD-10-CM

## 2024-12-19 LAB
ALBUMIN SERPL-MCNC: 4.9 G/DL (ref 3.4–5)
ALBUMIN/GLOB SERPL: 2.1 {RATIO} (ref 1.1–2.2)
ALP SERPL-CCNC: 89 U/L (ref 40–129)
ALT SERPL-CCNC: 30 U/L (ref 10–40)
ANION GAP SERPL CALCULATED.3IONS-SCNC: 13 MMOL/L (ref 3–16)
AST SERPL-CCNC: 23 U/L (ref 15–37)
BASOPHILS # BLD: 0.1 K/UL (ref 0–0.2)
BASOPHILS NFR BLD: 0.4 %
BILIRUB SERPL-MCNC: 0.5 MG/DL (ref 0–1)
BUN SERPL-MCNC: 30 MG/DL (ref 7–20)
CALCIUM SERPL-MCNC: 10.3 MG/DL (ref 8.3–10.6)
CHLORIDE SERPL-SCNC: 101 MMOL/L (ref 99–110)
CHOLEST SERPL-MCNC: 195 MG/DL (ref 0–199)
CO2 SERPL-SCNC: 25 MMOL/L (ref 21–32)
CREAT SERPL-MCNC: 1.2 MG/DL (ref 0.8–1.3)
CREAT UR-MCNC: 199 MG/DL (ref 39–259)
DEPRECATED RDW RBC AUTO: 13.1 % (ref 12.4–15.4)
EOSINOPHIL # BLD: 0 K/UL (ref 0–0.6)
EOSINOPHIL NFR BLD: 0 %
GFR SERPLBLD CREATININE-BSD FMLA CKD-EPI: 67 ML/MIN/{1.73_M2}
GLUCOSE SERPL-MCNC: 172 MG/DL (ref 70–99)
HBA1C MFR BLD: 6.3 %
HCT VFR BLD AUTO: 42.6 % (ref 40.5–52.5)
HDLC SERPL-MCNC: 44 MG/DL (ref 40–60)
HGB BLD-MCNC: 14.3 G/DL (ref 13.5–17.5)
LDLC SERPL CALC-MCNC: 119 MG/DL
LYMPHOCYTES # BLD: 0.9 K/UL (ref 1–5.1)
LYMPHOCYTES NFR BLD: 6.3 %
MCH RBC QN AUTO: 31 PG (ref 26–34)
MCHC RBC AUTO-ENTMCNC: 33.6 G/DL (ref 31–36)
MCV RBC AUTO: 92.2 FL (ref 80–100)
MICROALBUMIN UR DL<=1MG/L-MCNC: 1.23 MG/DL
MICROALBUMIN/CREAT UR: 6.2 MG/G (ref 0–30)
MONOCYTES # BLD: 0.7 K/UL (ref 0–1.3)
MONOCYTES NFR BLD: 4.6 %
NEUTROPHILS # BLD: 13 K/UL (ref 1.7–7.7)
NEUTROPHILS NFR BLD: 88.7 %
PLATELET # BLD AUTO: 242 K/UL (ref 135–450)
PMV BLD AUTO: 9.4 FL (ref 5–10.5)
POTASSIUM SERPL-SCNC: 4.6 MMOL/L (ref 3.5–5.1)
PROT SERPL-MCNC: 7.2 G/DL (ref 6.4–8.2)
PSA SERPL DL<=0.01 NG/ML-MCNC: 1.2 NG/ML (ref 0–4)
RBC # BLD AUTO: 4.62 M/UL (ref 4.2–5.9)
SODIUM SERPL-SCNC: 139 MMOL/L (ref 136–145)
TRIGL SERPL-MCNC: 162 MG/DL (ref 0–150)
TSH SERPL DL<=0.005 MIU/L-ACNC: 1.19 UIU/ML (ref 0.27–4.2)
VLDLC SERPL CALC-MCNC: 32 MG/DL
WBC # BLD AUTO: 14.7 K/UL (ref 4–11)

## 2024-12-19 RX ORDER — SILDENAFIL CITRATE 20 MG/1
TABLET ORAL
Qty: 30 TABLET | Refills: 5 | Status: SHIPPED | OUTPATIENT
Start: 2024-12-19

## 2024-12-19 RX ORDER — DICLOFENAC SODIUM 75 MG/1
75 TABLET, DELAYED RELEASE ORAL 2 TIMES DAILY
Qty: 180 TABLET | Refills: 0 | Status: SHIPPED | OUTPATIENT
Start: 2024-12-19

## 2024-12-19 RX ORDER — ATORVASTATIN CALCIUM 80 MG/1
80 TABLET, FILM COATED ORAL DAILY
Qty: 90 TABLET | Refills: 1 | Status: SHIPPED | OUTPATIENT
Start: 2024-12-19

## 2024-12-19 RX ORDER — LISINOPRIL AND HYDROCHLOROTHIAZIDE 20; 25 MG/1; MG/1
TABLET ORAL
Qty: 90 TABLET | Refills: 1 | Status: SHIPPED | OUTPATIENT
Start: 2024-12-19

## 2024-12-19 RX ORDER — AMLODIPINE BESYLATE 10 MG/1
TABLET ORAL
Qty: 90 TABLET | Refills: 1 | Status: SHIPPED | OUTPATIENT
Start: 2024-12-19

## 2024-12-19 SDOH — ECONOMIC STABILITY: FOOD INSECURITY: WITHIN THE PAST 12 MONTHS, THE FOOD YOU BOUGHT JUST DIDN'T LAST AND YOU DIDN'T HAVE MONEY TO GET MORE.: NEVER TRUE

## 2024-12-19 SDOH — ECONOMIC STABILITY: FOOD INSECURITY: WITHIN THE PAST 12 MONTHS, YOU WORRIED THAT YOUR FOOD WOULD RUN OUT BEFORE YOU GOT MONEY TO BUY MORE.: NEVER TRUE

## 2024-12-19 SDOH — ECONOMIC STABILITY: INCOME INSECURITY: HOW HARD IS IT FOR YOU TO PAY FOR THE VERY BASICS LIKE FOOD, HOUSING, MEDICAL CARE, AND HEATING?: NOT HARD AT ALL

## 2024-12-19 ASSESSMENT — PATIENT HEALTH QUESTIONNAIRE - PHQ9
2. FEELING DOWN, DEPRESSED OR HOPELESS: NOT AT ALL
SUM OF ALL RESPONSES TO PHQ QUESTIONS 1-9: 0
SUM OF ALL RESPONSES TO PHQ QUESTIONS 1-9: 0
1. LITTLE INTEREST OR PLEASURE IN DOING THINGS: NOT AT ALL
SUM OF ALL RESPONSES TO PHQ QUESTIONS 1-9: 0
SUM OF ALL RESPONSES TO PHQ9 QUESTIONS 1 & 2: 0
SUM OF ALL RESPONSES TO PHQ QUESTIONS 1-9: 0

## 2024-12-19 NOTE — PROGRESS NOTES
follow a low fat diet. Has  been reasonably  compliant w exercise. Lipids have been stable, The problem is controlled. Recent lipid tests were reviewed and are normal. Pertinent negatives include no chest pain, focal sensory loss, focal weakness, leg pain, myalgias or shortness of breath.  Advised patient to continue the current instructions or medications.        Obesity (BMI 30.0-34.9)  Call patient. Patient is due/overdue for an OV. Will refill meds, but needs to schedule an appointment.            Plan :     Labs ordered, reviewed.   Medications refilled.   All Health maintenance needs reviewed and the needful ordered.       Price Bunch MD  12/19/2024 9:26 AM

## 2024-12-30 LAB — DIABETIC RETINOPATHY: NEGATIVE

## 2025-03-14 RX ORDER — MONTELUKAST SODIUM 10 MG/1
10 TABLET ORAL DAILY
Qty: 90 TABLET | Refills: 1 | Status: SHIPPED | OUTPATIENT
Start: 2025-03-14

## 2025-03-14 NOTE — TELEPHONE ENCOUNTER
Medication:   Requested Prescriptions     Pending Prescriptions Disp Refills    montelukast (SINGULAIR) 10 MG tablet [Pharmacy Med Name: MONTELUKAST SOD 10 MG TABLET] 90 tablet 1     Sig: TAKE 1 TABLET BY MOUTH DAILY     Last Filled:  06/05/2024    Last appt: 12/19/2024   Next appt: Visit date not found    Last OARRS:        No data to display

## 2025-03-17 RX ORDER — AMLODIPINE BESYLATE 10 MG/1
10 TABLET ORAL DAILY
Qty: 90 TABLET | Refills: 1 | Status: SHIPPED | OUTPATIENT
Start: 2025-03-17

## 2025-03-17 NOTE — TELEPHONE ENCOUNTER
Medication:   Requested Prescriptions     Pending Prescriptions Disp Refills    amLODIPine (NORVASC) 10 MG tablet [Pharmacy Med Name: amLODIPine BESYLATE 10MG TAB] 90 tablet 1     Sig: TAKE 1 TABLET BY MOUTH DAILY     Last Filled:  12/19/2024    Last appt: 12/19/2024   Next appt: Visit date not found    Last OARRS:        No data to display

## 2025-05-28 ENCOUNTER — OFFICE VISIT (OUTPATIENT)
Dept: PRIMARY CARE CLINIC | Age: 65
End: 2025-05-28
Payer: MEDICARE

## 2025-05-28 VITALS
BODY MASS INDEX: 28.5 KG/M2 | SYSTOLIC BLOOD PRESSURE: 120 MMHG | WEIGHT: 216 LBS | RESPIRATION RATE: 13 BRPM | DIASTOLIC BLOOD PRESSURE: 82 MMHG | HEART RATE: 51 BPM | TEMPERATURE: 97.8 F | OXYGEN SATURATION: 99 %

## 2025-05-28 DIAGNOSIS — E66.811 OBESITY (BMI 30.0-34.9): ICD-10-CM

## 2025-05-28 DIAGNOSIS — E11.9 TYPE 2 DIABETES MELLITUS WITHOUT COMPLICATION, WITHOUT LONG-TERM CURRENT USE OF INSULIN (HCC): Primary | ICD-10-CM

## 2025-05-28 DIAGNOSIS — E78.2 MIXED HYPERLIPIDEMIA: ICD-10-CM

## 2025-05-28 DIAGNOSIS — N52.9 ERECTILE DYSFUNCTION, UNSPECIFIED ERECTILE DYSFUNCTION TYPE: ICD-10-CM

## 2025-05-28 DIAGNOSIS — I10 ESSENTIAL HYPERTENSION: ICD-10-CM

## 2025-05-28 LAB — HBA1C MFR BLD: 6.8 %

## 2025-05-28 PROCEDURE — 3079F DIAST BP 80-89 MM HG: CPT | Performed by: INTERNAL MEDICINE

## 2025-05-28 PROCEDURE — 1123F ACP DISCUSS/DSCN MKR DOCD: CPT | Performed by: INTERNAL MEDICINE

## 2025-05-28 PROCEDURE — 3074F SYST BP LT 130 MM HG: CPT | Performed by: INTERNAL MEDICINE

## 2025-05-28 PROCEDURE — 83036 HEMOGLOBIN GLYCOSYLATED A1C: CPT | Performed by: INTERNAL MEDICINE

## 2025-05-28 PROCEDURE — 99214 OFFICE O/P EST MOD 30 MIN: CPT | Performed by: INTERNAL MEDICINE

## 2025-05-28 SDOH — ECONOMIC STABILITY: FOOD INSECURITY: WITHIN THE PAST 12 MONTHS, THE FOOD YOU BOUGHT JUST DIDN'T LAST AND YOU DIDN'T HAVE MONEY TO GET MORE.: NEVER TRUE

## 2025-05-28 SDOH — ECONOMIC STABILITY: FOOD INSECURITY: WITHIN THE PAST 12 MONTHS, YOU WORRIED THAT YOUR FOOD WOULD RUN OUT BEFORE YOU GOT MONEY TO BUY MORE.: NEVER TRUE

## 2025-05-28 ASSESSMENT — PATIENT HEALTH QUESTIONNAIRE - PHQ9
SUM OF ALL RESPONSES TO PHQ QUESTIONS 1-9: 0
2. FEELING DOWN, DEPRESSED OR HOPELESS: NOT AT ALL
1. LITTLE INTEREST OR PLEASURE IN DOING THINGS: NOT AT ALL
SUM OF ALL RESPONSES TO PHQ QUESTIONS 1-9: 0

## 2025-05-28 NOTE — PROGRESS NOTES
SUBJECTIVE-  Established patient here for a visit to manage acute and chronic medical conditions as detailed below.    Type 2 diabetes mellitus without complication, without long-term current use of insulin (HCC)  Diabetes Mellitus Type II:  Home blood sugar records reviewed: fasting range: 120-130 .  No significant episodes of hypoglycemia. Compliant with medications.  No polyuria, polydipsia, visual changes, foot problems, GI upset.  Diabetic diet compliance:  compliant most of the time.  Current exercise: none. Will check labs, and refill medications as appropriate.     Hypertension:  Denies CP, SOB, visual changes, dizziness, palpitations or HA.  He is adherent to a low sodium diet.  Blood pressure typically runs ok  outside of the office. Continue current medications.     Hyperlipidemia:  No new myalgias or GI upset on current medications.              Lab Results   Component Value Date     LABA1C 7.2 12/18/2023     LABA1C 6.6 06/26/2023     LABA1C 6.7 12/16/2022            Lab Results   Component Value Date     CREATININE 1.0 12/18/2023            Lab Results   Component Value Date     ALT 15 12/18/2023     AST 14 (L) 12/18/2023      No components found for: \"CHLPL\"        Lab Results   Component Value Date     TRIG 144 12/18/2023            Lab Results   Component Value Date     HDL 38 (L) 12/18/2023            Lab Results   Component Value Date/Time     LDLDIRECT 120 03/22/2016 10:16 AM      This problem is stable, reviewed in detail, and advised patient to continue the current instructions or medications. Will continue to closely monitor the situation.        Mixed hyperlipidemia  This has been a long standing problem, takes no meds.       Monitors diet and tries to follow a low fat diet. Has  been reasonably  compliant w exercise. Lipids have been stable, The problem is controlled. Recent lipid tests were reviewed and are normal. Pertinent negatives include no chest pain, focal sensory loss, focal

## 2025-06-09 RX ORDER — SILDENAFIL CITRATE 20 MG/1
TABLET ORAL
Qty: 30 TABLET | Refills: 5 | Status: SHIPPED | OUTPATIENT
Start: 2025-06-09

## 2025-06-09 NOTE — TELEPHONE ENCOUNTER
Medication:   Requested Prescriptions     Pending Prescriptions Disp Refills    sildenafil (REVATIO) 20 MG tablet [Pharmacy Med Name: SILDENAFIL 20 MG TABLET] 30 tablet 5     Sig: TAKE 2 TABLETS BY MOUTH DAILY AS NEEDED     Last Filled:  12/19/2024    Last appt: 5/28/2025   Next appt: Visit date not found    Last OARRS:        No data to display

## 2025-06-24 RX ORDER — CELECOXIB 200 MG/1
200 CAPSULE ORAL 2 TIMES DAILY
Qty: 60 CAPSULE | Refills: 5 | Status: SHIPPED | OUTPATIENT
Start: 2025-06-24

## 2025-07-18 ENCOUNTER — OFFICE VISIT (OUTPATIENT)
Dept: PRIMARY CARE CLINIC | Age: 65
End: 2025-07-18
Payer: MEDICARE

## 2025-07-18 VITALS
HEIGHT: 73 IN | TEMPERATURE: 98.3 F | OXYGEN SATURATION: 97 % | BODY MASS INDEX: 27.83 KG/M2 | SYSTOLIC BLOOD PRESSURE: 118 MMHG | HEART RATE: 54 BPM | DIASTOLIC BLOOD PRESSURE: 70 MMHG | WEIGHT: 210 LBS

## 2025-07-18 DIAGNOSIS — H66.002 NON-RECURRENT ACUTE SUPPURATIVE OTITIS MEDIA OF LEFT EAR WITHOUT SPONTANEOUS RUPTURE OF TYMPANIC MEMBRANE: Primary | ICD-10-CM

## 2025-07-18 PROCEDURE — 3074F SYST BP LT 130 MM HG: CPT

## 2025-07-18 PROCEDURE — 1123F ACP DISCUSS/DSCN MKR DOCD: CPT

## 2025-07-18 PROCEDURE — 3078F DIAST BP <80 MM HG: CPT

## 2025-07-18 PROCEDURE — 99213 OFFICE O/P EST LOW 20 MIN: CPT

## 2025-07-18 RX ORDER — AMOXICILLIN 875 MG/1
875 TABLET, COATED ORAL 2 TIMES DAILY
Qty: 14 TABLET | Refills: 0 | Status: SHIPPED | OUTPATIENT
Start: 2025-07-18 | End: 2025-07-25

## 2025-07-18 RX ORDER — METHYLPREDNISOLONE 4 MG/1
TABLET ORAL
Qty: 1 KIT | Refills: 0 | Status: SHIPPED | OUTPATIENT
Start: 2025-07-18 | End: 2025-07-24

## 2025-07-18 ASSESSMENT — ENCOUNTER SYMPTOMS
SORE THROAT: 0
COUGH: 0
RHINORRHEA: 0
SINUS PRESSURE: 0
EYE PAIN: 0
SINUS PAIN: 0

## 2025-07-18 NOTE — ASSESSMENT & PLAN NOTE
Acute condition, new, Supportive care with appropriate antipyretics and fluids.  - Complete antibiotic and Medrol Dosepak as prescribed  -Stop taking Celebrex while taking Medrol dose pack  -Follow-up if symptoms worsen or fail to improve as expected

## 2025-07-23 ENCOUNTER — OFFICE VISIT (OUTPATIENT)
Dept: PRIMARY CARE CLINIC | Age: 65
End: 2025-07-23
Payer: MEDICARE

## 2025-07-23 VITALS
HEART RATE: 85 BPM | OXYGEN SATURATION: 94 % | TEMPERATURE: 97.2 F | BODY MASS INDEX: 27.57 KG/M2 | SYSTOLIC BLOOD PRESSURE: 134 MMHG | DIASTOLIC BLOOD PRESSURE: 82 MMHG | WEIGHT: 209 LBS

## 2025-07-23 DIAGNOSIS — H66.92 LEFT ACUTE OTITIS MEDIA: Primary | ICD-10-CM

## 2025-07-23 DIAGNOSIS — H91.92 HEARING LOSS OF LEFT EAR, UNSPECIFIED HEARING LOSS TYPE: ICD-10-CM

## 2025-07-23 PROCEDURE — 3075F SYST BP GE 130 - 139MM HG: CPT | Performed by: NURSE PRACTITIONER

## 2025-07-23 PROCEDURE — 1123F ACP DISCUSS/DSCN MKR DOCD: CPT | Performed by: NURSE PRACTITIONER

## 2025-07-23 PROCEDURE — 3079F DIAST BP 80-89 MM HG: CPT | Performed by: NURSE PRACTITIONER

## 2025-07-23 PROCEDURE — 99213 OFFICE O/P EST LOW 20 MIN: CPT | Performed by: NURSE PRACTITIONER

## 2025-07-23 RX ORDER — FLUTICASONE PROPIONATE 50 MCG
2 SPRAY, SUSPENSION (ML) NASAL DAILY
Qty: 1 EACH | Refills: 0 | Status: SHIPPED | OUTPATIENT
Start: 2025-07-23

## 2025-07-23 RX ORDER — CEFDINIR 300 MG/1
300 CAPSULE ORAL 2 TIMES DAILY
Qty: 14 CAPSULE | Refills: 0 | Status: SHIPPED | OUTPATIENT
Start: 2025-07-23 | End: 2025-07-30

## 2025-07-23 ASSESSMENT — ENCOUNTER SYMPTOMS
VOMITING: 0
VOICE CHANGE: 0
ABDOMINAL PAIN: 0
WHEEZING: 0
SINUS PRESSURE: 0
RHINORRHEA: 0
CHEST TIGHTNESS: 0
NAUSEA: 0
DIARRHEA: 0
SHORTNESS OF BREATH: 0
TROUBLE SWALLOWING: 0
SORE THROAT: 0
COUGH: 0
SINUS PAIN: 0

## 2025-07-23 NOTE — PROGRESS NOTES
ENCOUNTER DATE: 7/23/2025     NAME: Ismael Pillai   AGE: 64 y.o.   GENDER: male   YOB: 1960    Chief Complaint   Patient presents with    Ear Pain     LT , pt got prescribed amoxicillin and steroid 7/18/25 but not helping     Ear Fullness       ASSESSMENT/PLAN:  1. Left acute otitis media  Discussed possible TM perforation.  Advised nothing in ears  Continue on steroids until complete  Stop amoxicillin  And changed to cefdinir.  Continue on allergy regimen  Add Flonase to regimen  If no improvement in his symptoms by next week, should follow-up with ENT for further evaluation  - fluticasone (FLONASE) 50 MCG/ACT nasal spray; 2 sprays by Nasal route daily  Dispense: 1 each; Refill: 0  - [facility]  - cefdinir (OMNICEF) 300 MG capsule; Take 1 capsule by mouth 2 times daily for 7 days  Dispense: 14 capsule; Refill: 0    2. Hearing loss of left ear, unspecified hearing loss type  - [facility]      Return if symptoms worsen or fail to improve.     HPI:   Patient is here for sick visit    Seen in urgent care 7/18 for left ear pain  Patient had previously tried to irrigate ear with peroxide and OTC kit  Diagnosed with ear infection  Started on amoxicillin x 7 days and steroid pack    Continues to have pain in left ear. No improvement.   Bloody drainage out of left ear.   No fevers. Muffled hearing and tinnitus in left ear.   No nasal congestion or drainage.   States amoxicillin hasn't worked for him in the past.   Concerned about perforated eardrum    Takes zyrtec and singulair daily.     ROS:  Review of Systems   Constitutional:  Negative for chills, fatigue and fever.   HENT:  Positive for ear discharge, ear pain, hearing loss and tinnitus. Negative for congestion, postnasal drip, rhinorrhea, sinus pressure, sinus pain, sore throat, trouble swallowing and voice change.    Respiratory:  Negative for cough, chest tightness, shortness of breath and

## 2025-07-31 ENCOUNTER — OFFICE VISIT (OUTPATIENT)
Age: 65
End: 2025-07-31
Payer: MEDICARE

## 2025-07-31 ENCOUNTER — PROCEDURE VISIT (OUTPATIENT)
Age: 65
End: 2025-07-31
Payer: MEDICARE

## 2025-07-31 VITALS
TEMPERATURE: 97.1 F | SYSTOLIC BLOOD PRESSURE: 132 MMHG | OXYGEN SATURATION: 97 % | HEART RATE: 51 BPM | DIASTOLIC BLOOD PRESSURE: 76 MMHG

## 2025-07-31 DIAGNOSIS — H93.8X2 SENSATION OF FULLNESS IN LEFT EAR: ICD-10-CM

## 2025-07-31 DIAGNOSIS — R42 DIZZINESS: ICD-10-CM

## 2025-07-31 DIAGNOSIS — S01.312A LACERATION OF LEFT EAR CANAL, INITIAL ENCOUNTER: ICD-10-CM

## 2025-07-31 DIAGNOSIS — H60.312 ACUTE DIFFUSE OTITIS EXTERNA OF LEFT EAR: ICD-10-CM

## 2025-07-31 DIAGNOSIS — H90.3 SENSORINEURAL HEARING LOSS (SNHL) OF BOTH EARS: Primary | ICD-10-CM

## 2025-07-31 DIAGNOSIS — H93.12 TINNITUS OF LEFT EAR: ICD-10-CM

## 2025-07-31 PROCEDURE — 3075F SYST BP GE 130 - 139MM HG: CPT | Performed by: OTOLARYNGOLOGY

## 2025-07-31 PROCEDURE — 1123F ACP DISCUSS/DSCN MKR DOCD: CPT | Performed by: OTOLARYNGOLOGY

## 2025-07-31 PROCEDURE — 92504 EAR MICROSCOPY EXAMINATION: CPT | Performed by: OTOLARYNGOLOGY

## 2025-07-31 PROCEDURE — 92557 COMPREHENSIVE HEARING TEST: CPT

## 2025-07-31 PROCEDURE — 99204 OFFICE O/P NEW MOD 45 MIN: CPT | Performed by: OTOLARYNGOLOGY

## 2025-07-31 PROCEDURE — 92567 TYMPANOMETRY: CPT

## 2025-07-31 PROCEDURE — 3078F DIAST BP <80 MM HG: CPT | Performed by: OTOLARYNGOLOGY

## 2025-07-31 RX ORDER — OFLOXACIN 3 MG/ML
SOLUTION/ DROPS OPHTHALMIC
Qty: 1 EACH | Refills: 0 | Status: SHIPPED | OUTPATIENT
Start: 2025-07-31

## 2025-07-31 ASSESSMENT — ENCOUNTER SYMPTOMS
STRIDOR: 0
WHEEZING: 0
EYE ITCHING: 0
SINUS PAIN: 0
NAUSEA: 0
PHOTOPHOBIA: 0
RHINORRHEA: 0
VOMITING: 0
BACK PAIN: 0
TROUBLE SWALLOWING: 0
BLOOD IN STOOL: 0
COUGH: 0
EYE DISCHARGE: 0
FACIAL SWELLING: 0
SINUS PRESSURE: 0
CHOKING: 0
CONSTIPATION: 0
VOICE CHANGE: 0
DIARRHEA: 0
SORE THROAT: 0
SHORTNESS OF BREATH: 0
COLOR CHANGE: 0

## 2025-07-31 NOTE — PATIENT INSTRUCTIONS
alert you to the doorbell, a ringing telephone, or a baby monitor.  Television closed-captioning. This shows the words at the bottom of the screen. Most new TVs can do this.  TTY (text telephone). This lets you type messages back and forth on the telephone instead of talking or listening. These devices are also called TDD. When messages are typed on the keyboard, they are sent over the phone line to a receiving TTY. The message is shown on a monitor.  Use pagers, fax machines, text, and email if it is hard for you to communicate by telephone.  Try to learn a listening technique called speech-reading. It is not lip-reading. You pay attention to people's gestures, expressions, posture, and tone of voice. These clues can help you understand what a person is saying. Face the person you are talking to, and have him or her face you. Make sure the lighting is good. You need to see the other person's face clearly.  Think about counseling if you need help to adjust to your hearing loss.    When should you call for help?  Watch closely for changes in your health, and be sure to contact your doctor if:    You think your hearing is getting worse.     You have new symptoms, such as dizziness or nausea.              Good Communication Strategies    Communication can be challenging for anyone, but can be especially difficult for those with some degree of hearing loss.  While we may not be able to control every factor that may lead to difficulty with communication, there are Good Communication Strategies that we can all use in our day-to-day lives.  Communication takes both parties working together for it to be successful.    Tips as a Listener:   Control your environment.  It is important to limit the amount of background noise in the room when possible.  You should also consider having a good light source in the room to best see the other person.  Ask for clarification.  Instead of saying \"What?\", you can use parts of what you

## 2025-07-31 NOTE — PROGRESS NOTES
Ismael Pillai   1960, 64 y.o. male   [id number]       Referring Provider: Ismael Madden DO  Referral Type: In an order in Epic    Reason for Visit: Evaluation of suspected change in hearing, tinnitus, or balance.    ADULT AUDIOLOGIC EVALUATION      Ismael Pillai is a 64 y.o. male seen today, 7/31/2025 , for an initial audiologic evaluation.  Patient was seen by Ismael Madden DO following today's evaluation.    AUDIOLOGIC AND OTHER PERTINENT MEDICAL HISTORY:      Ismael Pillai reports a left-sided ear infection for about 2 weeks.  He noted that his left ear has felt blocked this whole time.  Patient noted occasional pressure in the left ear with some pain.  He stated his symptoms began after he flushed his own ears with water and hydrogen peroxide.  Patient noted his right ear may have some fluid, but the left ear seems to be worse.  Patient also reported bloody discharge from the left ear, usually in the mornings.  He endorsed left-sided tinnitus and lightheadedness since the onset of his ear infection. He endorsed a history of occupational noise exposure.     Statred after ear cleaning and navage same day. Sef ear cleaning with water  and hydrogen peroxide    He denied history of falls, history of head trauma, history of ear surgery, and family history of hearing loss    Date: 7/31/2025     IMPRESSIONS:      Today's results revealed a Normal sloping to Mild Sensorineural hearing loss in the right ear and a Mild Sensorineual hearing loss in the left ear. Excellent speech understanding when in quiet. Tympanometry indicates normal middle ear function. Discussed test results and implications with patient. Discussed benefits of amplification pending medical clearance.    Follow medical recommendations of Ismael Madden DO.    ASSESSMENT AND FINDINGS:     Otoscopy revealed debris in the left ear canal.    RIGHT EAR:  Hearing Sensitivity: Normal sloping to Mild Sensorineural hearing loss  Speech

## 2025-07-31 NOTE — PROGRESS NOTES
back pain, gait problem, neck pain and neck stiffness.   Skin:  Negative for color change, pallor, rash and wound.   Neurological:  Negative for dizziness, syncope, facial asymmetry, speech difficulty, light-headedness, numbness and headaches.   Hematological:  Negative for adenopathy. Does not bruise/bleed easily.   Psychiatric/Behavioral:  Negative for agitation, confusion and sleep disturbance.          PhysicalExam     Vitals:    07/31/25 1103   BP: 132/76   Pulse: 51   Temp: 97.1 °F (36.2 °C)   SpO2: 97%       Physical Exam  Constitutional:       Appearance: He is well-developed.   HENT:      Head: Normocephalic and atraumatic. Not macrocephalic and not microcephalic. No raccoon eyes, العراقي's sign, abrasion, contusion, right periorbital erythema, left periorbital erythema or laceration. Hair is normal.      Jaw: No trismus.      Right Ear: Hearing, tympanic membrane and external ear normal. No decreased hearing noted. No drainage, swelling or tenderness. No middle ear effusion. No mastoid tenderness. Tympanic membrane is not perforated, retracted or bulging. Tympanic membrane has normal mobility.      Left Ear: Hearing, tympanic membrane and external ear normal. No decreased hearing noted. Drainage and swelling present. No tenderness.  No middle ear effusion. No mastoid tenderness. Tympanic membrane is not perforated, retracted or bulging. Tympanic membrane has normal mobility.      Nose: No nasal deformity, septal deviation, laceration, mucosal edema or rhinorrhea.      Right Sinus: No maxillary sinus tenderness or frontal sinus tenderness.      Left Sinus: No maxillary sinus tenderness or frontal sinus tenderness.      Mouth/Throat:      Mouth: Mucous membranes are not pale, not dry and not cyanotic. No lacerations or oral lesions.      Dentition: Normal dentition. No dental caries or dental abscesses.      Pharynx: Uvula midline. No oropharyngeal exudate, posterior oropharyngeal erythema or uvula swelling.

## 2025-08-04 RX ORDER — MONTELUKAST SODIUM 10 MG/1
10 TABLET ORAL DAILY
Qty: 90 TABLET | Refills: 0 | Status: SHIPPED | OUTPATIENT
Start: 2025-08-04

## 2025-08-06 RX ORDER — LISINOPRIL AND HYDROCHLOROTHIAZIDE 20; 25 MG/1; MG/1
1 TABLET ORAL DAILY
Qty: 90 TABLET | Refills: 0 | Status: SHIPPED | OUTPATIENT
Start: 2025-08-06

## 2025-08-26 ENCOUNTER — OFFICE VISIT (OUTPATIENT)
Dept: PRIMARY CARE CLINIC | Age: 65
End: 2025-08-26
Payer: MEDICARE

## 2025-08-26 VITALS
TEMPERATURE: 97.8 F | SYSTOLIC BLOOD PRESSURE: 120 MMHG | DIASTOLIC BLOOD PRESSURE: 82 MMHG | WEIGHT: 210 LBS | HEIGHT: 73 IN | HEART RATE: 50 BPM | BODY MASS INDEX: 27.83 KG/M2 | RESPIRATION RATE: 15 BRPM | OXYGEN SATURATION: 97 %

## 2025-08-26 DIAGNOSIS — E78.2 MIXED HYPERLIPIDEMIA: ICD-10-CM

## 2025-08-26 DIAGNOSIS — I10 ESSENTIAL HYPERTENSION: ICD-10-CM

## 2025-08-26 DIAGNOSIS — Z12.11 SCREENING FOR COLON CANCER: ICD-10-CM

## 2025-08-26 DIAGNOSIS — E66.811 OBESITY (BMI 30.0-34.9): ICD-10-CM

## 2025-08-26 DIAGNOSIS — Z00.00 WELCOME TO MEDICARE PREVENTIVE VISIT: Primary | ICD-10-CM

## 2025-08-26 DIAGNOSIS — N52.9 ERECTILE DYSFUNCTION, UNSPECIFIED ERECTILE DYSFUNCTION TYPE: ICD-10-CM

## 2025-08-26 DIAGNOSIS — Z12.5 SCREENING FOR PROSTATE CANCER: ICD-10-CM

## 2025-08-26 DIAGNOSIS — E11.9 TYPE 2 DIABETES MELLITUS WITHOUT COMPLICATION, WITHOUT LONG-TERM CURRENT USE OF INSULIN (HCC): ICD-10-CM

## 2025-08-26 LAB — HBA1C MFR BLD: 6.8 %

## 2025-08-26 PROCEDURE — 3074F SYST BP LT 130 MM HG: CPT | Performed by: INTERNAL MEDICINE

## 2025-08-26 PROCEDURE — G0402 INITIAL PREVENTIVE EXAM: HCPCS | Performed by: INTERNAL MEDICINE

## 2025-08-26 PROCEDURE — 83036 HEMOGLOBIN GLYCOSYLATED A1C: CPT | Performed by: INTERNAL MEDICINE

## 2025-08-26 PROCEDURE — 3079F DIAST BP 80-89 MM HG: CPT | Performed by: INTERNAL MEDICINE

## 2025-08-26 PROCEDURE — 3044F HG A1C LEVEL LT 7.0%: CPT | Performed by: INTERNAL MEDICINE

## 2025-08-26 PROCEDURE — 1123F ACP DISCUSS/DSCN MKR DOCD: CPT | Performed by: INTERNAL MEDICINE

## 2025-08-26 ASSESSMENT — PATIENT HEALTH QUESTIONNAIRE - PHQ9
SUM OF ALL RESPONSES TO PHQ QUESTIONS 1-9: 0
2. FEELING DOWN, DEPRESSED OR HOPELESS: NOT AT ALL
SUM OF ALL RESPONSES TO PHQ QUESTIONS 1-9: 0
1. LITTLE INTEREST OR PLEASURE IN DOING THINGS: NOT AT ALL